# Patient Record
Sex: FEMALE | Race: WHITE | Employment: FULL TIME | URBAN - METROPOLITAN AREA
[De-identification: names, ages, dates, MRNs, and addresses within clinical notes are randomized per-mention and may not be internally consistent; named-entity substitution may affect disease eponyms.]

---

## 2017-01-27 ENCOUNTER — HOSPITAL ENCOUNTER (OUTPATIENT)
Dept: MAMMOGRAPHY | Age: 47
Discharge: HOME | End: 2017-01-27
Attending: OBSTETRICS & GYNECOLOGY
Payer: COMMERCIAL

## 2017-01-27 DIAGNOSIS — Z12.31 VISIT FOR SCREENING MAMMOGRAM: ICD-10-CM

## 2017-01-27 PROCEDURE — 77067 SCR MAMMO BI INCL CAD: CPT

## 2017-02-01 ENCOUNTER — HOSPITAL ENCOUNTER (OUTPATIENT)
Dept: MAMMOGRAPHY | Facility: HOSPITAL | Age: 47
Discharge: HOME | End: 2017-02-01
Attending: OBSTETRICS & GYNECOLOGY
Payer: COMMERCIAL

## 2017-02-01 DIAGNOSIS — R92.8 ABNORMAL MAMMOGRAM OF RIGHT BREAST: ICD-10-CM

## 2017-02-01 PROCEDURE — 77065 DX MAMMO INCL CAD UNI: CPT

## 2017-02-01 NOTE — IMAGING NOTE
Assisted Dr. Michael Lockett with Recommendation for a stereotactic breast biopsy for calcifications. Emotional and educational support provided. Written information provided to patient and she verbalized understanding.

## 2017-02-07 ENCOUNTER — TELEPHONE (OUTPATIENT)
Dept: MAMMOGRAPHY | Facility: HOSPITAL | Age: 47
End: 2017-02-07

## 2017-02-07 NOTE — TELEPHONE ENCOUNTER
Pt very anxious, calling about stereo biopsy due on 2/8. Emotional support provided and procedure explained. All questions answered and pt verbalized understanding.

## 2017-02-08 ENCOUNTER — HOSPITAL ENCOUNTER (OUTPATIENT)
Dept: MAMMOGRAPHY | Facility: HOSPITAL | Age: 47
Discharge: HOME | End: 2017-02-08
Attending: OBSTETRICS & GYNECOLOGY
Payer: COMMERCIAL

## 2017-02-08 DIAGNOSIS — R92.1 BREAST CALCIFICATIONS ON MAMMOGRAM: ICD-10-CM

## 2017-02-08 PROCEDURE — 88344 IMHCHEM/IMCYTCHM EA MLT ANTB: CPT | Performed by: OBSTETRICS & GYNECOLOGY

## 2017-02-08 PROCEDURE — 19081 BX BREAST 1ST LESION STRTCTC: CPT

## 2017-02-08 PROCEDURE — 88305 TISSUE EXAM BY PATHOLOGIST: CPT | Performed by: OBSTETRICS & GYNECOLOGY

## 2017-02-08 NOTE — PROCEDURES
PROCEDURE: ERIKA BIOPSY STEREOTACTIC W/CALC 1 SITE RIGHT    COMPARISON: [facility]DL VWS RIGHT (FOI=[id number]), 2/01/2017, 14:52.     INDICATIONS: R92.1 Mammographic calcification found on diagnostic imaging of breast    DESCRIPTION: Witnessed kenroy

## 2017-02-09 ENCOUNTER — TELEPHONE (OUTPATIENT)
Dept: MAMMOGRAPHY | Facility: HOSPITAL | Age: 47
End: 2017-02-09

## 2017-02-09 NOTE — TELEPHONE ENCOUNTER
Pt called and very anxious, she was inquiring if bx results were back yet. I informed her they are not and it can sometimes take up to 3 business days.  She informed me she has a result clinic appt tomorrow at 2pm. I called pathology and Charlie Trujillo will update th

## 2017-02-10 ENCOUNTER — TELEPHONE (OUTPATIENT)
Dept: MAMMOGRAPHY | Facility: HOSPITAL | Age: 47
End: 2017-02-10

## 2017-02-10 NOTE — TELEPHONE ENCOUNTER
Pt stated yesterday when I spoke with her she was ok to get benign results over the phone, but if + wanted to come in for result clinic appt at 2pm. I called and provided pt with biopsy results [facility]), I explained what the diagnosis meant and informed her t

## 2017-03-03 ENCOUNTER — OFFICE VISIT (OUTPATIENT)
Dept: SURGERY | Facility: CLINIC | Age: 47
End: 2017-03-03

## 2017-03-03 DIAGNOSIS — Z91.89 AT HIGH RISK FOR BREAST CANCER: ICD-10-CM

## 2017-03-03 DIAGNOSIS — Z12.39 BREAST CANCER SCREENING, HIGH RISK PATIENT: ICD-10-CM

## 2017-03-03 DIAGNOSIS — N60.91 ATYPICAL LOBULAR HYPERPLASIA (ALH) OF RIGHT BREAST: Primary | ICD-10-CM

## 2017-03-03 PROCEDURE — 99205 OFFICE O/P NEW HI 60 MIN: CPT | Performed by: SURGERY

## 2017-03-03 NOTE — PROGRESS NOTES
Patient presents with:  Consult: New consult, referred by co-worker for RB calcifications    Verified and updated allergy and medication list.     LMP: 02/06/17    Age of menarche: 17  Number of pregnancies:4  Age at 4st full term delivery: 31    Education

## 2017-03-04 NOTE — PROGRESS NOTES
Breast Surgery New Patient Consultation    This is the first visit for this 42year old woman, referred by self, who presents for evaluation of Right breast ALH.     History of Present Illness:   Ms. Alyssa Carson is a 42year old woman who presents w Ashkenazi Hoahaoism ancestry. Social History:    Alcohol Use: Yes       Smoking status: Never Smoker    Smokeless tobacco: Never Used   The patient is . She has 2 children. She is employed full time.      Review of Systems:  General:   The patient de or lymph nodes. Musculoskeletal:  The patient denies muscle aches/pain, joint pain, stiff joints, neck pain, back pain or bone pain.     Neuropsychiatric:  There is no history of migraines or severe headaches, seizure/epilepsy, speech problems, coordina There is no nipple retraction. No nipple discharge can be elicited. The parenchyma is mildly nodular. There are no dominant masses in the breast. The axillary tail is normal.    Abdomen: The abdomen is soft, flat and non tender. The liver is not enlarged. leading to additional imaging procedures and possible unnecessary biopsy. The specific of her imaging information were discussed, and in this context, we decided to proceed with MRI of both breasts. I will call her with the results when they are available.

## 2017-03-07 ENCOUNTER — TELEPHONE (OUTPATIENT)
Dept: SURGERY | Facility: CLINIC | Age: 47
End: 2017-03-07

## 2017-04-14 ENCOUNTER — HOSPITAL ENCOUNTER (OUTPATIENT)
Dept: MRI IMAGING | Age: 47
Discharge: HOME | End: 2017-04-14
Attending: SURGERY
Payer: COMMERCIAL

## 2017-04-14 DIAGNOSIS — N60.91 ATYPICAL LOBULAR HYPERPLASIA (ALH) OF RIGHT BREAST: ICD-10-CM

## 2017-04-14 DIAGNOSIS — Z12.39 BREAST CANCER SCREENING, HIGH RISK PATIENT: ICD-10-CM

## 2017-04-14 DIAGNOSIS — Z91.89 AT HIGH RISK FOR BREAST CANCER: ICD-10-CM

## 2017-04-14 DIAGNOSIS — N60.91 ATYPICAL LOBULAR HYPERPLASIA (ALH) OF RIGHT BREAST: Primary | ICD-10-CM

## 2017-04-14 PROCEDURE — 0159T MRI BREAST (W+WO) W/CAD BILAT (CPT=77059/0159T): CPT

## 2017-04-14 PROCEDURE — 77059 MRI BREAST (W+WO) W/CAD BILAT (CPT=77059/0159T): CPT

## 2017-04-14 PROCEDURE — A9575 INJ GADOTERATE MEGLUMI 0.1ML: HCPCS | Performed by: SURGERY

## 2017-04-17 ENCOUNTER — TELEPHONE (OUTPATIENT)
Dept: SURGERY | Facility: CLINIC | Age: 47
End: 2017-04-17

## 2017-04-17 NOTE — TELEPHONE ENCOUNTER
Name & date of birth verified. Pt notified that Dr. Tatiana Maravilla reviewed the MRI, and pt has no suspicious enhancement and does NOT need surgery at this time. Dr. Tatiana Maravilla wants to repeat pt's R mammogram in Oct, and has placed an order for this.  Pt should contac

## 2017-08-11 ENCOUNTER — TELEPHONE (OUTPATIENT)
Dept: SURGERY | Facility: CLINIC | Age: 47
End: 2017-08-11

## 2017-08-11 NOTE — TELEPHONE ENCOUNTER
Patient received a notice for mammogram in the mail from [facility], was confused because it did not come from [facility].     Reviewed past notes and confirmed that she is due for imaging in October and she asked that we start the insurance pre-authorization

## 2017-10-02 ENCOUNTER — TELEPHONE (OUTPATIENT)
Dept: SURGERY | Facility: CLINIC | Age: 47
End: 2017-10-02

## 2017-10-02 NOTE — TELEPHONE ENCOUNTER
Pt phoned in to report that she has had some back pain, between her shoulder blades that now moves to her chest, and some occ numbness, since her biopsy and MRI. It has improved over time, but still bothers her.   It improves with exercise and ibuprofen he

## 2017-10-06 PROBLEM — J45.21 MILD INTERMITTENT ASTHMA WITH EXACERBATION: Status: ACTIVE | Noted: 2017-10-06

## 2017-10-06 PROBLEM — J45.21 MILD INTERMITTENT ASTHMA WITH EXACERBATION (HCC): Status: ACTIVE | Noted: 2017-10-06

## 2017-11-24 ENCOUNTER — HOSPITAL ENCOUNTER (OUTPATIENT)
Dept: MAMMOGRAPHY | Age: 47
Discharge: HOME | End: 2017-11-24
Attending: SURGERY
Payer: COMMERCIAL

## 2017-11-24 DIAGNOSIS — N60.91 ATYPICAL LOBULAR HYPERPLASIA (ALH) OF RIGHT BREAST: ICD-10-CM

## 2017-11-24 PROCEDURE — 77065 DX MAMMO INCL CAD UNI: CPT | Performed by: SURGERY

## 2017-11-24 PROCEDURE — 77061 BREAST TOMOSYNTHESIS UNI: CPT | Performed by: SURGERY

## 2017-11-27 ENCOUNTER — TELEPHONE (OUTPATIENT)
Dept: SURGERY | Facility: CLINIC | Age: 47
End: 2017-11-27

## 2017-11-27 DIAGNOSIS — N60.99 ATYPICAL LOBULAR HYPERPLASIA (ALH) OF BREAST: Primary | ICD-10-CM

## 2017-11-27 DIAGNOSIS — Z12.39 BREAST CANCER SCREENING, HIGH RISK PATIENT: ICD-10-CM

## 2017-11-27 DIAGNOSIS — Z91.89 AT HIGH RISK FOR BREAST CANCER: ICD-10-CM

## 2017-11-27 NOTE — TELEPHONE ENCOUNTER
Pt identified herself on VM. LVM letting her know Dr Cipriano Navarro reviewed her recent imaging, and mammogram is normal. She does recommend breast MRI for surveillance that would take place in May. Asked patient to call me back to discuss.

## 2017-11-27 NOTE — TELEPHONE ENCOUNTER
Pt returned the call. She is agreeable to the MRI in May, pending insurance approval. I reviewed that we get it authorized closer to the time it's due.

## 2018-04-24 ENCOUNTER — TELEPHONE (OUTPATIENT)
Dept: SURGERY | Facility: CLINIC | Age: 48
End: 2018-04-24

## 2018-04-28 ENCOUNTER — OFFICE VISIT (OUTPATIENT)
Dept: OBGYN CLINIC | Facility: CLINIC | Age: 48
End: 2018-04-28

## 2018-04-28 VITALS
RESPIRATION RATE: 16 BRPM | DIASTOLIC BLOOD PRESSURE: 60 MMHG | HEIGHT: 66 IN | WEIGHT: 135 LBS | HEART RATE: 64 BPM | BODY MASS INDEX: 21.69 KG/M2 | SYSTOLIC BLOOD PRESSURE: 110 MMHG

## 2018-04-28 DIAGNOSIS — Z01.419 WELL WOMAN EXAM WITH ROUTINE GYNECOLOGICAL EXAM: ICD-10-CM

## 2018-04-28 DIAGNOSIS — Z12.4 SCREENING FOR MALIGNANT NEOPLASM OF CERVIX: Primary | ICD-10-CM

## 2018-04-28 DIAGNOSIS — Z12.39 SCREENING FOR MALIGNANT NEOPLASM OF BREAST: ICD-10-CM

## 2018-04-28 PROCEDURE — 99396 PREV VISIT EST AGE 40-64: CPT | Performed by: OBSTETRICS & GYNECOLOGY

## 2018-04-28 PROCEDURE — 87624 HPV HI-RISK TYP POOLED RSLT: CPT | Performed by: OBSTETRICS & GYNECOLOGY

## 2018-04-28 PROCEDURE — 88175 CYTOPATH C/V AUTO FLUID REDO: CPT | Performed by: OBSTETRICS & GYNECOLOGY

## 2018-04-28 NOTE — PROGRESS NOTES
Malcolm Moreira is a 41year old female  Patient's last menstrual period was 2018 (approximate). Patient presents with:  Wellness Visit: annual  .     Every month. complains of hot flashes and night sweats. Blood work will be ordered.   Mammo Wheezing., Disp: 1 Inhaler, Rfl: 0  •  Lysine 500 MG Oral Cap, Take by mouth., Disp: , Rfl:     ALLERGIES:    Seasonal                      Review of Systems:  Constitutional:  Denies fatigue, night sweats, hot flashes  Gastrointestinal:  denies heartburn,

## 2018-11-14 ENCOUNTER — TELEPHONE (OUTPATIENT)
Dept: SURGERY | Facility: CLINIC | Age: 48
End: 2018-11-14

## 2018-11-14 DIAGNOSIS — Z12.39 SCREENING FOR BREAST CANCER: Primary | ICD-10-CM

## 2018-11-14 NOTE — TELEPHONE ENCOUNTER
LVM letting her know that Dr Cipriano Navarro placed the order for her mammogram.   Asked her to call the office to arrange a follow up after the mammogram, since our last visit was 3/2017. Left central scheduling and office phone numbers.

## 2018-11-27 ENCOUNTER — TELEPHONE (OUTPATIENT)
Dept: SURGERY | Facility: CLINIC | Age: 48
End: 2018-11-27

## 2018-11-27 NOTE — TELEPHONE ENCOUNTER
JUSTUSM in response to patient having questions about her scheduled mammogram.   I let her know that it is a 2D3D screening mammogram.   Asked her to call me back with any additional questions

## 2018-11-29 ENCOUNTER — HOSPITAL ENCOUNTER (OUTPATIENT)
Dept: MAMMOGRAPHY | Age: 48
Discharge: HOME | End: 2018-11-29
Attending: SURGERY
Payer: COMMERCIAL

## 2018-11-29 DIAGNOSIS — Z12.39 SCREENING FOR BREAST CANCER: ICD-10-CM

## 2018-11-29 PROCEDURE — 77067 SCR MAMMO BI INCL CAD: CPT | Performed by: SURGERY

## 2018-11-29 PROCEDURE — 77063 BREAST TOMOSYNTHESIS BI: CPT | Performed by: SURGERY

## 2018-11-29 PROCEDURE — 82306 VITAMIN D 25 HYDROXY: CPT | Performed by: OBSTETRICS & GYNECOLOGY

## 2018-11-29 PROCEDURE — 80061 LIPID PANEL: CPT | Performed by: OBSTETRICS & GYNECOLOGY

## 2018-11-29 PROCEDURE — 80050 GENERAL HEALTH PANEL: CPT | Performed by: OBSTETRICS & GYNECOLOGY

## 2018-11-30 ENCOUNTER — TELEPHONE (OUTPATIENT)
Dept: SURGERY | Facility: CLINIC | Age: 48
End: 2018-11-30

## 2018-11-30 DIAGNOSIS — R92.8 ABNORMAL MAMMOGRAM OF BOTH BREASTS: Primary | ICD-10-CM

## 2018-11-30 NOTE — TELEPHONE ENCOUNTER
I contacted the patient that she needs Add views as whole breast could not be seen with the implants in the pictures they got and have her get those done before her followup office visit with me.      I let her know we should reschedule her follow up , if t

## 2018-12-05 ENCOUNTER — HOSPITAL ENCOUNTER (OUTPATIENT)
Dept: ULTRASOUND IMAGING | Age: 48
Discharge: HOME | End: 2018-12-05
Attending: SURGERY
Payer: COMMERCIAL

## 2018-12-05 ENCOUNTER — HOSPITAL ENCOUNTER (OUTPATIENT)
Dept: MAMMOGRAPHY | Age: 48
Discharge: HOME | End: 2018-12-05
Attending: SURGERY
Payer: COMMERCIAL

## 2018-12-05 DIAGNOSIS — R92.8 ABNORMAL MAMMOGRAM OF BOTH BREASTS: ICD-10-CM

## 2018-12-05 DIAGNOSIS — N63.20 LEFT BREAST MASS: Primary | ICD-10-CM

## 2018-12-05 PROCEDURE — 76642 ULTRASOUND BREAST LIMITED: CPT | Performed by: SURGERY

## 2018-12-05 PROCEDURE — 77062 BREAST TOMOSYNTHESIS BI: CPT | Performed by: SURGERY

## 2018-12-05 PROCEDURE — 77066 DX MAMMO INCL CAD BI: CPT | Performed by: SURGERY

## 2018-12-05 NOTE — IMAGING NOTE
Provided pt with written information on left breast u/s bx x 1 site at the recommendation of Dr Nydia Randle. Pt verbalized understanding, answered questions and emotional support provided, denies blood thinners/ASA/NSAIDS, biopsy order sheet faxed to [facility].

## 2018-12-06 ENCOUNTER — TELEPHONE (OUTPATIENT)
Dept: SURGERY | Facility: CLINIC | Age: 48
End: 2018-12-06

## 2018-12-06 NOTE — TELEPHONE ENCOUNTER
I contacted the patient regarding the recommendation for left breast biopsy. I confirmed with her that Dr Frantz Bazan agrees we should do the biopsy. Pt talked about the timing, and results, and her work schedule.    We will talk after it's scheduled and arran

## 2018-12-10 ENCOUNTER — TELEPHONE (OUTPATIENT)
Dept: MAMMOGRAPHY | Facility: HOSPITAL | Age: 48
End: 2018-12-10

## 2018-12-10 NOTE — TELEPHONE ENCOUNTER
This RN returned pt's voice mail regarding taking vitamin D tablets before her breast biopsy. Instructed pt that it is okay to take her home medications. Allowed pt to vent frustrations and concerns.

## 2018-12-12 ENCOUNTER — HOSPITAL ENCOUNTER (OUTPATIENT)
Dept: ULTRASOUND IMAGING | Age: 48
Discharge: HOME | End: 2018-12-12
Attending: SURGERY
Payer: COMMERCIAL

## 2018-12-12 ENCOUNTER — HOSPITAL ENCOUNTER (OUTPATIENT)
Dept: MAMMOGRAPHY | Age: 48
Discharge: HOME | End: 2018-12-12
Attending: SURGERY
Payer: COMMERCIAL

## 2018-12-12 DIAGNOSIS — N63.20 LEFT BREAST MASS: ICD-10-CM

## 2018-12-12 PROCEDURE — 77065 DX MAMMO INCL CAD UNI: CPT | Performed by: SURGERY

## 2018-12-12 PROCEDURE — 88305 TISSUE EXAM BY PATHOLOGIST: CPT | Performed by: SURGERY

## 2018-12-12 PROCEDURE — 19083 BX BREAST 1ST LESION US IMAG: CPT | Performed by: SURGERY

## 2018-12-12 NOTE — IMAGING NOTE
Procedure explained throughout and all questions answered. Consent and discharge paperwork signed by Manuelito Desir. Left breast positioned. Assisted Dr. Sonia Randle with US guided biopsy.  Pt tolerated well. Pressure held on biopsy site for 10 min.  No

## 2018-12-12 NOTE — PROCEDURES
DESCRIPTION:  The patient's previous mammogram/ultrasound were reviewed. The procedure, its alternatives, and potential risks and complications were discussed with the patient prior to the biopsy procedure. This included breast implant rupture.     Kusum Hill

## 2018-12-14 ENCOUNTER — OFFICE VISIT (OUTPATIENT)
Dept: SURGERY | Facility: CLINIC | Age: 48
End: 2018-12-14
Payer: COMMERCIAL

## 2018-12-14 VITALS
WEIGHT: 139 LBS | HEIGHT: 66.75 IN | HEART RATE: 67 BPM | OXYGEN SATURATION: 98 % | RESPIRATION RATE: 18 BRPM | DIASTOLIC BLOOD PRESSURE: 74 MMHG | SYSTOLIC BLOOD PRESSURE: 134 MMHG | BODY MASS INDEX: 21.82 KG/M2

## 2018-12-14 DIAGNOSIS — N60.91 ATYPICAL LOBULAR HYPERPLASIA (ALH) OF RIGHT BREAST: ICD-10-CM

## 2018-12-14 DIAGNOSIS — R92.8 ABNORMAL MAMMOGRAM OF LEFT BREAST: Primary | ICD-10-CM

## 2018-12-14 PROCEDURE — 99214 OFFICE O/P EST MOD 30 MIN: CPT | Performed by: SURGERY

## 2019-03-04 NOTE — PROGRESS NOTES
Breast Surgery Surveillance    History of Present Illness:   Ms. Malcolm Moreira is a 57year old woman who presented with a right imaging detected breast mass/calcifications. She was referred for diagnostic imaging which is detailed below.  She denies SURGICAL HISTORY      breast implants   • REMOVAL OF OVARIAN CYST(S) Left      Gynecological History:  Pt is a   Pt was 31years old at time of first pregnancy. She denies any cumulative breastfeeding history.   She achieved menarche at age 17 and LM breathing when lying flat, SOB/Coughing at night, swelling of the legs or chest pain while walking.     Breasts:  See history of present illness    Gastrointestinal:     There is no history of difficulty or pain with swallowing, reflux symptoms, vomiting, d appears her stated age. Her speech patterns and movements are normal. Her affect is appropriate. HEENT: The head is normocephalic. The neck is supple. The thyroid is not enlarged and is without palpable masses/nodules. There are no palpable masses.  The her recent imaging and pathology and we discussed this at length. The significance and implications of Scotts found on core biopsy with regard to future breast cancer risk was reviewed with the patient.  I explained that surigical excision of the area in th

## 2019-06-14 ENCOUNTER — HOSPITAL ENCOUNTER (OUTPATIENT)
Dept: ULTRASOUND IMAGING | Age: 49
Discharge: HOME | End: 2019-06-14
Attending: SURGERY
Payer: COMMERCIAL

## 2019-06-14 ENCOUNTER — HOSPITAL ENCOUNTER (OUTPATIENT)
Dept: MAMMOGRAPHY | Age: 49
Discharge: HOME | End: 2019-06-14
Attending: SURGERY
Payer: COMMERCIAL

## 2019-06-14 DIAGNOSIS — R92.8 ABNORMAL MAMMOGRAM OF LEFT BREAST: ICD-10-CM

## 2019-06-14 DIAGNOSIS — Z98.890 STATUS POST BIOPSY: ICD-10-CM

## 2019-06-14 PROCEDURE — 77065 DX MAMMO INCL CAD UNI: CPT | Performed by: SURGERY

## 2019-06-14 PROCEDURE — 77061 BREAST TOMOSYNTHESIS UNI: CPT | Performed by: SURGERY

## 2019-06-14 PROCEDURE — 76642 ULTRASOUND BREAST LIMITED: CPT | Performed by: SURGERY

## 2019-06-16 DIAGNOSIS — Z12.39 SCREENING BREAST EXAMINATION: Primary | ICD-10-CM

## 2019-07-22 PROBLEM — M26.609 TMJ DYSFUNCTION: Status: ACTIVE | Noted: 2019-07-22

## 2019-12-13 ENCOUNTER — HOSPITAL ENCOUNTER (OUTPATIENT)
Dept: MAMMOGRAPHY | Age: 49
Discharge: HOME | End: 2019-12-13
Attending: SURGERY
Payer: COMMERCIAL

## 2019-12-13 DIAGNOSIS — Z12.39 SCREENING BREAST EXAMINATION: ICD-10-CM

## 2019-12-13 PROCEDURE — 77063 BREAST TOMOSYNTHESIS BI: CPT | Performed by: SURGERY

## 2019-12-13 PROCEDURE — 77067 SCR MAMMO BI INCL CAD: CPT | Performed by: SURGERY

## 2021-02-15 NOTE — PROGRESS NOTES
Breast Surgery Surveillance Visit    History of Present Illness:   Ms. Juliana Archuleta is a 52year old woman who presented with a right imaging detected breast mass/calcifications. She was referred for diagnostic imaging which is detailed below.  She d cervical cancer screening 04/15, 02/12, 11/08, 10/07, 11/04    negative hpv negative   • Thalassemia minor      Past Surgical History:   Procedure Laterality Date   • CYST REMOVAL  2002    [facility]   • IMPLANTABLE BREAST PROSTHESIS     • ERIKA BIOPSY STEREO NODULE abnormal sound when breathing. Cardiovascular:   There is no history of chest pain, chest pressure/discomfort, palpitations, irregular heartbeat, fainting or near-fainting, difficulty breathing when lying flat, SOB/Coughing at night, swelling of the leg Temp 96.8 °F (36 °C) (Tympanic)   Resp 18   Ht 1.689 m (5' 6.5\")   Wt 61.7 kg (136 lb)   LMP 11/03/2020   SpO2 100%   BMI 21.62 kg/m²     Physical Exam:  The patient is an alert, oriented, well-nourished and  well-developed woman who appears her stated a and benign left breast biopsy. Discussion and Plan:  I had a discussion with the Patient regarding her breast exam. On exam today I found her to have no clinical evidence of malignancy bilaterally.   I personally reviewed her prior imaging we discussed t

## 2021-02-19 ENCOUNTER — OFFICE VISIT (OUTPATIENT)
Dept: SURGERY | Facility: CLINIC | Age: 51
End: 2021-02-19
Payer: COMMERCIAL

## 2021-02-19 VITALS
SYSTOLIC BLOOD PRESSURE: 148 MMHG | DIASTOLIC BLOOD PRESSURE: 85 MMHG | WEIGHT: 136 LBS | HEIGHT: 66.5 IN | RESPIRATION RATE: 18 BRPM | OXYGEN SATURATION: 100 % | BODY MASS INDEX: 21.6 KG/M2 | HEART RATE: 66 BPM | TEMPERATURE: 97 F

## 2021-02-19 DIAGNOSIS — Z12.31 ENCOUNTER FOR SCREENING MAMMOGRAM FOR HIGH-RISK PATIENT: ICD-10-CM

## 2021-02-19 DIAGNOSIS — N60.91 ATYPICAL LOBULAR HYPERPLASIA (ALH) OF RIGHT BREAST: Primary | ICD-10-CM

## 2021-02-19 PROCEDURE — 3079F DIAST BP 80-89 MM HG: CPT | Performed by: SURGERY

## 2021-02-19 PROCEDURE — 3077F SYST BP >= 140 MM HG: CPT | Performed by: SURGERY

## 2021-02-19 PROCEDURE — 3008F BODY MASS INDEX DOCD: CPT | Performed by: SURGERY

## 2021-02-19 PROCEDURE — 99214 OFFICE O/P EST MOD 30 MIN: CPT | Performed by: SURGERY

## 2021-03-30 ENCOUNTER — HOSPITAL ENCOUNTER (OUTPATIENT)
Dept: MAMMOGRAPHY | Age: 51
Discharge: HOME | End: 2021-03-30
Attending: SURGERY
Payer: COMMERCIAL

## 2021-03-30 DIAGNOSIS — Z12.31 ENCOUNTER FOR SCREENING MAMMOGRAM FOR HIGH-RISK PATIENT: ICD-10-CM

## 2021-03-30 PROCEDURE — 77067 SCR MAMMO BI INCL CAD: CPT | Performed by: SURGERY

## 2021-03-30 PROCEDURE — 77063 BREAST TOMOSYNTHESIS BI: CPT | Performed by: SURGERY

## 2021-03-31 ENCOUNTER — HOSPITAL ENCOUNTER (OUTPATIENT)
Dept: MAMMOGRAPHY | Facility: HOSPITAL | Age: 51
Discharge: HOME | End: 2021-03-31
Attending: SURGERY
Payer: COMMERCIAL

## 2021-03-31 DIAGNOSIS — R92.2 INCONCLUSIVE MAMMOGRAM: ICD-10-CM

## 2021-03-31 PROCEDURE — 77065 DX MAMMO INCL CAD UNI: CPT | Performed by: SURGERY

## 2021-03-31 PROCEDURE — 77061 BREAST TOMOSYNTHESIS UNI: CPT | Performed by: SURGERY

## 2021-03-31 NOTE — IMAGING NOTE
This Breast Care RN assisted Dr. Irene Beth with recommendation for a left breast 1 site stereotactic biopsy for calcifications. Procedure reviewed and all questions answered. Emotional and educational support given.    On the day of the biopsy, pt instruct

## 2021-04-01 ENCOUNTER — HOSPITAL ENCOUNTER (OUTPATIENT)
Dept: MAMMOGRAPHY | Facility: HOSPITAL | Age: 51
Discharge: HOME | End: 2021-04-01
Attending: PHYSICIAN ASSISTANT
Payer: COMMERCIAL

## 2021-04-01 DIAGNOSIS — R92.8 ABNORMAL MAMMOGRAM OF LEFT BREAST: ICD-10-CM

## 2021-04-01 PROCEDURE — 19081 BX BREAST 1ST LESION STRTCTC: CPT | Performed by: PHYSICIAN ASSISTANT

## 2021-04-01 PROCEDURE — 88305 TISSUE EXAM BY PATHOLOGIST: CPT | Performed by: PHYSICIAN ASSISTANT

## 2021-04-01 PROCEDURE — 88344 IMHCHEM/IMCYTCHM EA MLT ANTB: CPT | Performed by: PHYSICIAN ASSISTANT

## 2021-04-06 ENCOUNTER — TELEPHONE (OUTPATIENT)
Dept: MAMMOGRAPHY | Facility: HOSPITAL | Age: 51
End: 2021-04-06

## 2021-04-06 NOTE — TELEPHONE ENCOUNTER
Telephoned Mere Christine and name,  verified with patient. Earl Brock was aware of left breast positive for [facility] biopsy result. Concordance pending. Earl Vinod reports biopsy site is healing well.    Radiologist recommends surgical

## 2021-04-26 NOTE — PROGRESS NOTES
Breast Surgery Surveillance Visit    History of Present Illness:   Ms. Trevor Coles is a 52year old woman who presented with a right imaging detected breast mass/calcifications. She was referred for diagnostic imaging which is detailed below.  She d Diagnosis Date   • Chronic rhinitis    • Pap smear for cervical cancer screening 04/15, 02/12, 11/08, 10/07, 11/04    negative hpv negative   • Thalassemia minor      Past Surgical History:   Procedure Laterality Date   • CYST REMOVAL  2002    [facility]   • IMP emphysema, chronic bronchitis, shortness of breath or abnormal sound when breathing. Cardiovascular:   There is no history of chest pain, chest pressure/discomfort, palpitations, irregular heartbeat, fainting or near-fainting, difficulty breathing when Patient Position: Sitting, Cuff Size: adult)   Pulse 64   Temp 97.7 °F (36.5 °C)   Resp 18   Ht 1.689 m (5' 6.5\")   Wt 60 kg (132 lb 3.2 oz)   LMP 11/03/2020   SpO2 99%   BMI 21.02 kg/m²     Physical Exam:  The patient is an alert, oriented, well-nourishe 52year old woman presents with h/o right breast ALH and new diagnosis of left breast ALH.     Discussion and Plan:  I had a discussion with the Patient regarding her breast exam. On exam today I found her to have no clinical evidence of malignancy nomi

## 2021-04-27 ENCOUNTER — OFFICE VISIT (OUTPATIENT)
Dept: SURGERY | Facility: CLINIC | Age: 51
End: 2021-04-27
Payer: COMMERCIAL

## 2021-04-27 VITALS
SYSTOLIC BLOOD PRESSURE: 149 MMHG | TEMPERATURE: 98 F | DIASTOLIC BLOOD PRESSURE: 89 MMHG | OXYGEN SATURATION: 99 % | HEIGHT: 66.5 IN | WEIGHT: 132.19 LBS | RESPIRATION RATE: 18 BRPM | HEART RATE: 64 BPM | BODY MASS INDEX: 20.99 KG/M2

## 2021-04-27 DIAGNOSIS — Z91.89 AT HIGH RISK FOR BREAST CANCER: ICD-10-CM

## 2021-04-27 DIAGNOSIS — R92.2 DENSE BREAST: ICD-10-CM

## 2021-04-27 DIAGNOSIS — N60.92 ATYPICAL LOBULAR HYPERPLASIA (ALH) OF LEFT BREAST: Primary | ICD-10-CM

## 2021-04-27 PROCEDURE — 3077F SYST BP >= 140 MM HG: CPT | Performed by: SURGERY

## 2021-04-27 PROCEDURE — 3079F DIAST BP 80-89 MM HG: CPT | Performed by: SURGERY

## 2021-04-27 PROCEDURE — 99214 OFFICE O/P EST MOD 30 MIN: CPT | Performed by: SURGERY

## 2021-04-27 PROCEDURE — 3008F BODY MASS INDEX DOCD: CPT | Performed by: SURGERY

## 2021-05-25 ENCOUNTER — TELEPHONE (OUTPATIENT)
Dept: OBGYN CLINIC | Facility: CLINIC | Age: 51
End: 2021-05-25

## 2021-05-25 NOTE — TELEPHONE ENCOUNTER
Pt is calling to speak with a nurse about heavy bleeding she experienced yesterday, pt states she thought she was hemorrhaging because the amount of blood. Pt has upcoming appointment for 6/12 but is concerned this can not wait. Please call to advise.   Off

## 2021-05-25 NOTE — TELEPHONE ENCOUNTER
Patient has not been into office since 4/28/18. Advised because she is no longer established patient, she should refer to pcp on advisement. Understanding verbalized. Pt had bleeding episode Saturday, cannot make it in tomorrow. Patient will keep currentl

## 2021-05-26 ENCOUNTER — TELEPHONE (OUTPATIENT)
Dept: OBGYN CLINIC | Facility: CLINIC | Age: 51
End: 2021-05-26

## 2021-05-26 NOTE — TELEPHONE ENCOUNTER
Per Jordyn Le a  [facility] [address] for [employer], pt has a current insurance policy for medical coverage until the end of this moth. Reference # for this call is [id number].  Thanks

## 2021-05-28 ENCOUNTER — OFFICE VISIT (OUTPATIENT)
Dept: OBGYN CLINIC | Facility: CLINIC | Age: 51
End: 2021-05-28
Payer: COMMERCIAL

## 2021-05-28 VITALS
DIASTOLIC BLOOD PRESSURE: 82 MMHG | SYSTOLIC BLOOD PRESSURE: 126 MMHG | HEART RATE: 85 BPM | BODY MASS INDEX: 20.93 KG/M2 | WEIGHT: 131.81 LBS | HEIGHT: 66.5 IN

## 2021-05-28 DIAGNOSIS — Z12.31 ENCOUNTER FOR SCREENING MAMMOGRAM FOR BREAST CANCER: Primary | ICD-10-CM

## 2021-05-28 DIAGNOSIS — Z01.419 WELL WOMAN EXAM WITH ROUTINE GYNECOLOGICAL EXAM: ICD-10-CM

## 2021-05-28 DIAGNOSIS — Z12.4 PAPANICOLAOU SMEAR FOR CERVICAL CANCER SCREENING: ICD-10-CM

## 2021-05-28 PROCEDURE — 88175 CYTOPATH C/V AUTO FLUID REDO: CPT | Performed by: OBSTETRICS & GYNECOLOGY

## 2021-05-28 PROCEDURE — 3008F BODY MASS INDEX DOCD: CPT | Performed by: OBSTETRICS & GYNECOLOGY

## 2021-05-28 PROCEDURE — 3079F DIAST BP 80-89 MM HG: CPT | Performed by: OBSTETRICS & GYNECOLOGY

## 2021-05-28 PROCEDURE — 3074F SYST BP LT 130 MM HG: CPT | Performed by: OBSTETRICS & GYNECOLOGY

## 2021-05-28 PROCEDURE — 99386 PREV VISIT NEW AGE 40-64: CPT | Performed by: OBSTETRICS & GYNECOLOGY

## 2021-05-28 RX ORDER — MULTIVIT-MIN/IRON/FOLIC ACID/K 18-600-40
CAPSULE ORAL
COMMUNITY

## 2021-05-28 NOTE — PROGRESS NOTES
Bulmaro Dye is a 52year old female [id number] Patient's last menstrual period was 05/22/2021 (exact date). No chief complaint on file. Almita Spar Periods are becoming irregular she has been 14 to 21 days late not using anything for birth control.   Has yash Tobacco Use      Smoking status: Never Smoker      Smokeless tobacco: Never Used    Vaping Use      Vaping Use: Never used    Substance and Sexual Activity      Alcohol use: Yes      Drug use: No      Sexual activity: Yes        Partners: Male        Birth ALLERGIES:    Seasonal                      Review of Systems:  Constitutional:  Denies fatigue, night sweats, hot flashes  Gastrointestinal:  denies heartburn, abdominal pain, diarrhea or constipation  Genitourinary:  denies dysuria, incontinence, abn

## 2021-07-08 ENCOUNTER — OFFICE VISIT (OUTPATIENT)
Dept: OBGYN CLINIC | Facility: CLINIC | Age: 51
End: 2021-07-08
Payer: COMMERCIAL

## 2021-07-08 ENCOUNTER — TELEPHONE (OUTPATIENT)
Dept: OBGYN CLINIC | Facility: CLINIC | Age: 51
End: 2021-07-08

## 2021-07-08 VITALS
SYSTOLIC BLOOD PRESSURE: 110 MMHG | WEIGHT: 134 LBS | DIASTOLIC BLOOD PRESSURE: 52 MMHG | BODY MASS INDEX: 21.28 KG/M2 | HEART RATE: 66 BPM | HEIGHT: 66.5 IN

## 2021-07-08 DIAGNOSIS — Z30.430 ENCOUNTER FOR INSERTION OF INTRAUTERINE CONTRACEPTIVE DEVICE: ICD-10-CM

## 2021-07-08 DIAGNOSIS — Z01.812 PRE-PROCEDURAL LABORATORY EXAMINATION: Primary | ICD-10-CM

## 2021-07-08 LAB
CONTROL LINE PRESENT WITH A CLEAR BACKGROUND (YES/NO): YES YES/NO
PREGNANCY TEST, URINE: NEGATIVE

## 2021-07-08 PROCEDURE — 3078F DIAST BP <80 MM HG: CPT | Performed by: OBSTETRICS & GYNECOLOGY

## 2021-07-08 PROCEDURE — 3074F SYST BP LT 130 MM HG: CPT | Performed by: OBSTETRICS & GYNECOLOGY

## 2021-07-08 PROCEDURE — 81025 URINE PREGNANCY TEST: CPT | Performed by: OBSTETRICS & GYNECOLOGY

## 2021-07-08 PROCEDURE — 58300 INSERT INTRAUTERINE DEVICE: CPT | Performed by: OBSTETRICS & GYNECOLOGY

## 2021-07-08 PROCEDURE — 3008F BODY MASS INDEX DOCD: CPT | Performed by: OBSTETRICS & GYNECOLOGY

## 2021-07-08 RX ORDER — IBUPROFEN 200 MG
600 TABLET ORAL ONCE
Status: COMPLETED | OUTPATIENT
Start: 2021-07-08 | End: 2021-07-08

## 2021-07-08 RX ADMIN — IBUPROFEN 600 MG: 200 MG TABLET ORAL at 16:24:00

## 2021-07-08 NOTE — TELEPHONE ENCOUNTER
Spoke to Benjamin Ahuja from [employer]. She stated pt has coverage as of today and it is a month to month plan.   Ref # [id number]

## 2021-07-08 NOTE — PROGRESS NOTES
IUD Insertion     Pregnancy Results: negative from urine test   Birth control method(s) used: Facetectomy  She has consulted her oncologist who agrees that the [facility] might be a good reasonable treatment for her heavy periods    Consent signed.   Procedure d

## 2021-08-06 ENCOUNTER — TELEPHONE (OUTPATIENT)
Dept: OBGYN CLINIC | Facility: CLINIC | Age: 51
End: 2021-08-06

## 2021-08-06 ENCOUNTER — OFFICE VISIT (OUTPATIENT)
Dept: OBGYN CLINIC | Facility: CLINIC | Age: 51
End: 2021-08-06
Payer: COMMERCIAL

## 2021-08-06 VITALS
DIASTOLIC BLOOD PRESSURE: 80 MMHG | HEIGHT: 66.5 IN | SYSTOLIC BLOOD PRESSURE: 100 MMHG | HEART RATE: 49 BPM | BODY MASS INDEX: 21.12 KG/M2 | WEIGHT: 133 LBS

## 2021-08-06 DIAGNOSIS — Z30.40 ENCOUNTER FOR SURVEILLANCE OF CONTRACEPTIVES, UNSPECIFIED CONTRACEPTIVE: Primary | ICD-10-CM

## 2021-08-06 RX ORDER — LEVONORGESTREL 13.5 MG/1
1 INTRAUTERINE DEVICE INTRAUTERINE ONCE
COMMUNITY
Start: 2021-07-08

## 2021-08-06 NOTE — PROGRESS NOTES
Gyne note       S: patient is a 52year old yo  here for IUD check   Bleeding: regular menses, lighter  Pain: minimal cramping after placement, now none             O:/80   Pulse (!) 49   Ht 66.5\"   Wt 133 lb (60.3 kg)   LMP 2021 (Exact

## 2021-08-06 NOTE — TELEPHONE ENCOUNTER
Spoke to Phillip from [employer]. Insurance was verified, pt is currentlly eligible, it is on a month to month basis.   Ref # [id number]

## 2021-09-20 ENCOUNTER — HOSPITAL ENCOUNTER (OUTPATIENT)
Dept: MRI IMAGING | Facility: HOSPITAL | Age: 51
Discharge: HOME | End: 2021-09-20
Attending: SURGERY
Payer: COMMERCIAL

## 2021-09-20 DIAGNOSIS — N60.92 ATYPICAL LOBULAR HYPERPLASIA (ALH) OF LEFT BREAST: ICD-10-CM

## 2021-09-20 DIAGNOSIS — R92.2 DENSE BREAST: ICD-10-CM

## 2021-09-20 DIAGNOSIS — Z91.89 AT HIGH RISK FOR BREAST CANCER: ICD-10-CM

## 2021-09-20 PROCEDURE — 77049 MRI BREAST C-+ W/CAD BI: CPT | Performed by: SURGERY

## 2021-09-20 PROCEDURE — A9575 INJ GADOTERATE MEGLUMI 0.1ML: HCPCS | Performed by: SURGERY

## 2022-02-11 ENCOUNTER — LAB ENCOUNTER (OUTPATIENT)
Dept: LAB | Age: 52
End: 2022-02-11
Attending: OBSTETRICS & GYNECOLOGY
Payer: COMMERCIAL

## 2022-02-11 DIAGNOSIS — Z13.228 ENCOUNTER FOR SCREENING FOR METABOLIC DISORDER: ICD-10-CM

## 2022-02-11 DIAGNOSIS — Z13.220 ENCOUNTER FOR SCREENING FOR LIPID DISORDER: ICD-10-CM

## 2022-02-11 DIAGNOSIS — Z01.419 WELL WOMAN EXAM WITH ROUTINE GYNECOLOGICAL EXAM: ICD-10-CM

## 2022-02-11 LAB
ALBUMIN SERPL-MCNC: 4.1 G/DL (ref 3.4–5)
ALBUMIN/GLOB SERPL: 1.3 {RATIO} (ref 1–2)
ALP LIVER SERPL-CCNC: 27 U/L
ALT SERPL-CCNC: 18 U/L
ANION GAP SERPL CALC-SCNC: 3 MMOL/L (ref 0–18)
BASOPHILS # BLD AUTO: 0.03 X10(3) UL (ref 0–0.2)
BASOPHILS NFR BLD AUTO: 0.6 %
BILIRUB SERPL-MCNC: 1 MG/DL (ref 0.1–2)
BUN BLD-MCNC: 11 MG/DL (ref 7–18)
CALCIUM BLD-MCNC: 9.3 MG/DL (ref 8.5–10.1)
CHLORIDE SERPL-SCNC: 105 MMOL/L (ref 98–112)
CHOLEST SERPL-MCNC: 153 MG/DL (ref ?–200)
CO2 SERPL-SCNC: 29 MMOL/L (ref 21–32)
CREAT BLD-MCNC: 0.86 MG/DL
EOSINOPHIL # BLD AUTO: 0.22 X10(3) UL (ref 0–0.7)
EOSINOPHIL NFR BLD AUTO: 4.2 %
ERYTHROCYTE [DISTWIDTH] IN BLOOD BY AUTOMATED COUNT: 17.4 %
FASTING PATIENT LIPID ANSWER: YES
FASTING STATUS PATIENT QL REPORTED: YES
GLOBULIN PLAS-MCNC: 3.1 G/DL (ref 2.8–4.4)
GLUCOSE BLD-MCNC: 93 MG/DL (ref 70–99)
HCT VFR BLD AUTO: 36.5 %
HDLC SERPL-MCNC: 85 MG/DL (ref 40–59)
HGB BLD-MCNC: 11.3 G/DL
IMM GRANULOCYTES # BLD AUTO: 0.02 X10(3) UL (ref 0–1)
IMM GRANULOCYTES NFR BLD: 0.4 %
LDLC SERPL CALC-MCNC: 57 MG/DL (ref ?–100)
LYMPHOCYTES # BLD AUTO: 1.63 X10(3) UL (ref 1–4)
LYMPHOCYTES NFR BLD AUTO: 31.2 %
MCH RBC QN AUTO: 20.1 PG (ref 26–34)
MCHC RBC AUTO-ENTMCNC: 31 G/DL (ref 31–37)
MCV RBC AUTO: 64.9 FL
MONOCYTES # BLD AUTO: 0.47 X10(3) UL (ref 0.1–1)
MONOCYTES NFR BLD AUTO: 9 %
NEUTROPHILS # BLD AUTO: 2.85 X10 (3) UL (ref 1.5–7.7)
NEUTROPHILS # BLD AUTO: 2.85 X10(3) UL (ref 1.5–7.7)
NEUTROPHILS NFR BLD AUTO: 54.6 %
NONHDLC SERPL-MCNC: 68 MG/DL (ref ?–130)
OSMOLALITY SERPL CALC.SUM OF ELEC: 283 MOSM/KG (ref 275–295)
PLATELET # BLD AUTO: 322 10(3)UL (ref 150–450)
POTASSIUM SERPL-SCNC: 4.3 MMOL/L (ref 3.5–5.1)
PROT SERPL-MCNC: 7.2 G/DL (ref 6.4–8.2)
RBC # BLD AUTO: 5.62 X10(6)UL
SODIUM SERPL-SCNC: 137 MMOL/L (ref 136–145)
TRIGL SERPL-MCNC: 50 MG/DL (ref 30–149)
TSI SER-ACNC: 1.36 MIU/ML (ref 0.36–3.74)
VLDLC SERPL CALC-MCNC: 7 MG/DL (ref 0–30)
WBC # BLD AUTO: 5.2 X10(3) UL (ref 4–11)

## 2022-02-11 PROCEDURE — 85025 COMPLETE CBC W/AUTO DIFF WBC: CPT

## 2022-02-11 PROCEDURE — 80061 LIPID PANEL: CPT

## 2022-02-11 PROCEDURE — 36415 COLL VENOUS BLD VENIPUNCTURE: CPT

## 2022-02-11 PROCEDURE — 84443 ASSAY THYROID STIM HORMONE: CPT

## 2022-02-11 PROCEDURE — 80053 COMPREHEN METABOLIC PANEL: CPT

## 2022-04-01 ENCOUNTER — HOSPITAL ENCOUNTER (OUTPATIENT)
Dept: MAMMOGRAPHY | Facility: HOSPITAL | Age: 52
Discharge: HOME | End: 2022-04-01
Attending: OBSTETRICS & GYNECOLOGY
Payer: COMMERCIAL

## 2022-04-01 DIAGNOSIS — Z12.31 ENCOUNTER FOR SCREENING MAMMOGRAM FOR BREAST CANCER: ICD-10-CM

## 2022-04-01 PROCEDURE — 77063 BREAST TOMOSYNTHESIS BI: CPT | Performed by: OBSTETRICS & GYNECOLOGY

## 2022-04-01 PROCEDURE — 77067 SCR MAMMO BI INCL CAD: CPT | Performed by: OBSTETRICS & GYNECOLOGY

## 2022-09-02 ENCOUNTER — OFFICE VISIT (OUTPATIENT)
Dept: OBGYN CLINIC | Facility: CLINIC | Age: 52
End: 2022-09-02
Payer: COMMERCIAL

## 2022-09-02 VITALS
WEIGHT: 136 LBS | DIASTOLIC BLOOD PRESSURE: 62 MMHG | HEART RATE: 85 BPM | SYSTOLIC BLOOD PRESSURE: 104 MMHG | BODY MASS INDEX: 21.35 KG/M2 | HEIGHT: 67 IN

## 2022-09-02 DIAGNOSIS — Z01.419 WELL WOMAN EXAM WITH ROUTINE GYNECOLOGICAL EXAM: ICD-10-CM

## 2022-09-02 DIAGNOSIS — Z12.31 ENCOUNTER FOR SCREENING MAMMOGRAM FOR BREAST CANCER: Primary | ICD-10-CM

## 2022-09-02 PROCEDURE — 3008F BODY MASS INDEX DOCD: CPT | Performed by: OBSTETRICS & GYNECOLOGY

## 2022-09-02 PROCEDURE — 3074F SYST BP LT 130 MM HG: CPT | Performed by: OBSTETRICS & GYNECOLOGY

## 2022-09-02 PROCEDURE — 3078F DIAST BP <80 MM HG: CPT | Performed by: OBSTETRICS & GYNECOLOGY

## 2022-09-02 PROCEDURE — 99396 PREV VISIT EST AGE 40-64: CPT | Performed by: OBSTETRICS & GYNECOLOGY

## 2022-09-30 ENCOUNTER — OFFICE VISIT (OUTPATIENT)
Dept: SURGERY | Facility: CLINIC | Age: 52
End: 2022-09-30
Payer: COMMERCIAL

## 2022-09-30 VITALS
RESPIRATION RATE: 16 BRPM | DIASTOLIC BLOOD PRESSURE: 80 MMHG | SYSTOLIC BLOOD PRESSURE: 137 MMHG | WEIGHT: 134 LBS | HEART RATE: 67 BPM | OXYGEN SATURATION: 98 % | BODY MASS INDEX: 21 KG/M2

## 2022-09-30 DIAGNOSIS — N60.99 ATYPICAL HYPERPLASIA OF BREAST: Primary | ICD-10-CM

## 2022-09-30 DIAGNOSIS — R92.2 DENSE BREAST: ICD-10-CM

## 2022-09-30 DIAGNOSIS — Z91.89 AT HIGH RISK FOR BREAST CANCER: ICD-10-CM

## 2022-09-30 PROCEDURE — 3079F DIAST BP 80-89 MM HG: CPT | Performed by: SURGERY

## 2022-09-30 PROCEDURE — 3075F SYST BP GE 130 - 139MM HG: CPT | Performed by: SURGERY

## 2022-09-30 PROCEDURE — 99213 OFFICE O/P EST LOW 20 MIN: CPT | Performed by: SURGERY

## 2022-10-21 ENCOUNTER — HOSPITAL ENCOUNTER (OUTPATIENT)
Dept: MRI IMAGING | Facility: HOSPITAL | Age: 52
Discharge: HOME | End: 2022-10-21
Attending: SURGERY
Payer: COMMERCIAL

## 2022-10-21 DIAGNOSIS — N60.99 ATYPICAL HYPERPLASIA OF BREAST: ICD-10-CM

## 2022-10-21 DIAGNOSIS — R92.2 DENSE BREAST: ICD-10-CM

## 2022-10-21 DIAGNOSIS — Z91.89 AT HIGH RISK FOR BREAST CANCER: ICD-10-CM

## 2022-10-21 PROCEDURE — A9575 INJ GADOTERATE MEGLUMI 0.1ML: HCPCS | Performed by: SURGERY

## 2022-10-21 PROCEDURE — 77049 MRI BREAST C-+ W/CAD BI: CPT | Performed by: SURGERY

## 2022-12-02 ENCOUNTER — E-ADVICE (OUTPATIENT)
Dept: CARDIOLOGY | Age: 52
End: 2022-12-02

## 2022-12-02 ENCOUNTER — OFFICE VISIT (OUTPATIENT)
Dept: CARDIOLOGY | Age: 52
End: 2022-12-02

## 2022-12-02 VITALS
HEART RATE: 66 BPM | WEIGHT: 133 LBS | HEIGHT: 67 IN | DIASTOLIC BLOOD PRESSURE: 80 MMHG | SYSTOLIC BLOOD PRESSURE: 147 MMHG | BODY MASS INDEX: 20.88 KG/M2

## 2022-12-02 DIAGNOSIS — R07.2 PRECORDIAL PAIN: ICD-10-CM

## 2022-12-02 DIAGNOSIS — Z82.49 FAMILY HISTORY OF PREMATURE CAD: ICD-10-CM

## 2022-12-02 DIAGNOSIS — R93.1 ELEVATED CORONARY ARTERY CALCIUM SCORE: Primary | ICD-10-CM

## 2022-12-02 PROCEDURE — 99204 OFFICE O/P NEW MOD 45 MIN: CPT | Performed by: INTERNAL MEDICINE

## 2022-12-02 RX ORDER — ALBUTEROL SULFATE 90 UG/1
AEROSOL, METERED RESPIRATORY (INHALATION)
COMMUNITY
Start: 2022-10-25

## 2022-12-02 RX ORDER — ATORVASTATIN CALCIUM 20 MG/1
20 TABLET, FILM COATED ORAL DAILY
Qty: 90 TABLET | Refills: 3 | Status: SHIPPED | OUTPATIENT
Start: 2022-12-02 | End: 2023-06-09 | Stop reason: SDUPTHER

## 2022-12-02 SDOH — HEALTH STABILITY: PHYSICAL HEALTH: ON AVERAGE, HOW MANY MINUTES DO YOU ENGAGE IN EXERCISE AT THIS LEVEL?: 60 MIN

## 2022-12-02 SDOH — HEALTH STABILITY: PHYSICAL HEALTH: ON AVERAGE, HOW MANY DAYS PER WEEK DO YOU ENGAGE IN MODERATE TO STRENUOUS EXERCISE (LIKE A BRISK WALK)?: 3 DAYS

## 2022-12-02 ASSESSMENT — ENCOUNTER SYMPTOMS
WEIGHT GAIN: 0
WEIGHT LOSS: 0
COUGH: 0
HEMATOCHEZIA: 0
FEVER: 0
HEMOPTYSIS: 0
ALLERGIC/IMMUNOLOGIC COMMENTS: NO NEW FOOD ALLERGIES
CHILLS: 0
SUSPICIOUS LESIONS: 0
BRUISES/BLEEDS EASILY: 0

## 2022-12-02 ASSESSMENT — PATIENT HEALTH QUESTIONNAIRE - PHQ9
SUM OF ALL RESPONSES TO PHQ9 QUESTIONS 1 AND 2: 0
CLINICAL INTERPRETATION OF PHQ2 SCORE: NO FURTHER SCREENING NEEDED
1. LITTLE INTEREST OR PLEASURE IN DOING THINGS: NOT AT ALL
SUM OF ALL RESPONSES TO PHQ9 QUESTIONS 1 AND 2: 0
2. FEELING DOWN, DEPRESSED OR HOPELESS: NOT AT ALL

## 2022-12-06 ENCOUNTER — TELEPHONE (OUTPATIENT)
Dept: CARDIOLOGY | Age: 52
End: 2022-12-06

## 2022-12-06 DIAGNOSIS — R07.2 PRECORDIAL PAIN: Primary | ICD-10-CM

## 2022-12-06 DIAGNOSIS — R93.1 ELEVATED CORONARY ARTERY CALCIUM SCORE: ICD-10-CM

## 2022-12-06 DIAGNOSIS — Z82.49 FAMILY HISTORY OF PREMATURE CAD: ICD-10-CM

## 2023-01-02 ENCOUNTER — APPOINTMENT (OUTPATIENT)
Dept: CARDIOLOGY | Age: 53
End: 2023-01-02
Attending: INTERNAL MEDICINE

## 2023-01-04 ENCOUNTER — APPOINTMENT (OUTPATIENT)
Dept: CARDIOLOGY | Age: 53
End: 2023-01-04
Attending: INTERNAL MEDICINE

## 2023-01-13 ENCOUNTER — E-ADVICE (OUTPATIENT)
Dept: CARDIOLOGY | Age: 53
End: 2023-01-13

## 2023-01-14 LAB
ALBUMIN SERPL-MCNC: 4.8 G/DL (ref 3.6–5.1)
ALBUMIN/GLOB SERPL: 1.9 (CALC) (ref 1–2.5)
ALP SERPL-CCNC: 35 U/L (ref 37–153)
ALT SERPL-CCNC: 24 U/L (ref 6–29)
AST SERPL-CCNC: 25 U/L (ref 10–35)
BILIRUB SERPL-MCNC: 1 MG/DL (ref 0.2–1.2)
BUN SERPL-MCNC: 17 MG/DL (ref 7–25)
BUN/CREAT SERPL: ABNORMAL (CALC) (ref 6–22)
CALCIUM SERPL-MCNC: 9.9 MG/DL (ref 8.6–10.4)
CHLORIDE SERPL-SCNC: 103 MMOL/L (ref 98–110)
CHOLEST SERPL-MCNC: 119 MG/DL
CHOLEST/HDLC SERPL: 1.6 (CALC)
CO2 SERPL-SCNC: 31 MMOL/L (ref 20–32)
CREAT SERPL-MCNC: 0.85 MG/DL (ref 0.5–1.03)
EGFRCR SERPLBLD CKD-EPI 2021: 83 ML/MIN/1.73M2
GLOBULIN SER CALC-MCNC: 2.5 G/DL (CALC) (ref 1.9–3.7)
GLUCOSE SERPL-MCNC: 92 MG/DL (ref 65–99)
HDLC SERPL-MCNC: 75 MG/DL
LDLC SERPL CALC-MCNC: 33 MG/DL (CALC)
LPA SERPL-SCNC: <10 NMOL/L
NONHDLC SERPL-MCNC: 44 MG/DL (CALC)
POTASSIUM SERPL-SCNC: 4.8 MMOL/L (ref 3.5–5.3)
PROT SERPL-MCNC: 7.3 G/DL (ref 6.1–8.1)
SODIUM SERPL-SCNC: 137 MMOL/L (ref 135–146)
TRIGL SERPL-MCNC: 40 MG/DL

## 2023-01-25 ENCOUNTER — APPOINTMENT (OUTPATIENT)
Dept: CARDIOLOGY | Age: 53
End: 2023-01-25
Attending: INTERNAL MEDICINE

## 2023-02-09 ENCOUNTER — APPOINTMENT (OUTPATIENT)
Dept: CARDIOLOGY | Age: 53
End: 2023-02-09
Attending: INTERNAL MEDICINE

## 2023-02-22 ENCOUNTER — ANCILLARY PROCEDURE (OUTPATIENT)
Dept: CARDIOLOGY | Age: 53
End: 2023-02-22
Attending: INTERNAL MEDICINE

## 2023-02-22 ENCOUNTER — E-ADVICE (OUTPATIENT)
Dept: CARDIOLOGY | Age: 53
End: 2023-02-22

## 2023-02-22 DIAGNOSIS — R07.2 PRECORDIAL PAIN: ICD-10-CM

## 2023-02-22 DIAGNOSIS — R93.1 ELEVATED CORONARY ARTERY CALCIUM SCORE: ICD-10-CM

## 2023-02-22 DIAGNOSIS — Z82.49 FAMILY HISTORY OF PREMATURE CAD: ICD-10-CM

## 2023-02-22 LAB — STRESS TARGET HR: 170 BPM

## 2023-02-22 PROCEDURE — 93351 STRESS TTE COMPLETE: CPT | Performed by: INTERNAL MEDICINE

## 2023-02-23 ENCOUNTER — TELEPHONE (OUTPATIENT)
Dept: CARDIOLOGY | Age: 53
End: 2023-02-23

## 2023-04-03 ENCOUNTER — HOSPITAL ENCOUNTER (OUTPATIENT)
Dept: MAMMOGRAPHY | Facility: HOSPITAL | Age: 53
Discharge: HOME | End: 2023-04-03
Attending: OBSTETRICS & GYNECOLOGY
Payer: COMMERCIAL

## 2023-04-03 DIAGNOSIS — Z12.31 ENCOUNTER FOR SCREENING MAMMOGRAM FOR BREAST CANCER: ICD-10-CM

## 2023-04-03 PROCEDURE — 77063 BREAST TOMOSYNTHESIS BI: CPT | Performed by: OBSTETRICS & GYNECOLOGY

## 2023-04-03 PROCEDURE — 77067 SCR MAMMO BI INCL CAD: CPT | Performed by: OBSTETRICS & GYNECOLOGY

## 2023-04-20 ENCOUNTER — HOSPITAL ENCOUNTER (OUTPATIENT)
Dept: MAMMOGRAPHY | Facility: HOSPITAL | Age: 53
Discharge: HOME | End: 2023-04-20
Attending: OBSTETRICS & GYNECOLOGY
Payer: COMMERCIAL

## 2023-04-20 DIAGNOSIS — R92.2 INCONCLUSIVE MAMMOGRAM: ICD-10-CM

## 2023-04-20 DIAGNOSIS — R92.1 BREAST CALCIFICATIONS: Primary | ICD-10-CM

## 2023-04-20 PROCEDURE — 77065 DX MAMMO INCL CAD UNI: CPT | Performed by: OBSTETRICS & GYNECOLOGY

## 2023-04-20 PROCEDURE — 77061 BREAST TOMOSYNTHESIS UNI: CPT | Performed by: OBSTETRICS & GYNECOLOGY

## 2023-04-28 ENCOUNTER — HOSPITAL ENCOUNTER (OUTPATIENT)
Dept: MAMMOGRAPHY | Facility: HOSPITAL | Age: 53
Discharge: HOME | End: 2023-04-28
Attending: OBSTETRICS & GYNECOLOGY
Payer: COMMERCIAL

## 2023-04-28 DIAGNOSIS — R92.1 BREAST CALCIFICATIONS: ICD-10-CM

## 2023-04-28 PROCEDURE — 88342 IMHCHEM/IMCYTCHM 1ST ANTB: CPT | Performed by: OBSTETRICS & GYNECOLOGY

## 2023-04-28 PROCEDURE — 88305 TISSUE EXAM BY PATHOLOGIST: CPT | Performed by: OBSTETRICS & GYNECOLOGY

## 2023-04-28 PROCEDURE — 88341 IMHCHEM/IMCYTCHM EA ADD ANTB: CPT | Performed by: OBSTETRICS & GYNECOLOGY

## 2023-04-28 PROCEDURE — 19081 BX BREAST 1ST LESION STRTCTC: CPT | Performed by: OBSTETRICS & GYNECOLOGY

## 2023-05-01 NOTE — IMAGING NOTE
9310: Spoke with RN Andrzej Farah in Dr. Sho Solis office. Discussed pathology results for Ciaran Christine's right breast biopsy performed April 28 as follows:   Final Diagnosis:   Stereotactic core biopsy, breast, right, 11:00:  -Breast tissue showing features consistent with atypical ductal hyperplasia (ADH) associated with microcalcifications. Per Andrzej Farah- Dr. Nahomi Oliva referring to Dr. Carlos Norwood. Informed Andrzej Farah that this RN discussed pathology as above with Tom Luong. Ms. Sherry Hendrickson shared that she has established care with Dr. Danielle Lozada. Instructed Ciaran Goodsonbrad to make an appointment with Dr. Danielle Lozada. Andrzej Farah will report pathology as above and Ms. Christine's intent to continue care with Dr. Danielle Lozada to Dr. Nahomi Oliva.

## 2023-05-01 NOTE — IMAGING NOTE
9858: Spoke with Maria Del Rosario Reed post stereotactic right breast biopsy. Introduced myself and informed Ms. Christine of the purpose of my call. Name and date of birth verified by patient. Reinforced post biopsy care and instruction. Ms. Mounika Nelson denies any issues with biopsy site- bleeding, drainage, redness, tenderness. Pathology results and recommendations discussed as follows:   Final Diagnosis:   Stereotactic core biopsy, breast, right, 11:00:  -Breast tissue showing features consistent with atypical ductal hyperplasia (ADH) associated with microcalcifications. See EMR for complete pathology report    Recommendation- surgical referral  Maria Del Rosario Reed shared that she is under the care of Dr. Amrit Arias. Ms. Mounika Nelson instructed to make an appointment with Dr. Amrit Arias. Encouraged Maria Del Rosario Reed to follow-up with Dr. Yanick Logan or the breast center if she has questions/concerns prior to appointment with Dr. Amrit Arias. Maria Del Rosario Reed verbalized understanding and agreement to the above.

## 2023-05-23 ENCOUNTER — OFFICE VISIT (OUTPATIENT)
Dept: SURGERY | Facility: CLINIC | Age: 53
End: 2023-05-23
Payer: COMMERCIAL

## 2023-05-23 ENCOUNTER — TELEPHONE (OUTPATIENT)
Dept: SURGERY | Facility: CLINIC | Age: 53
End: 2023-05-23

## 2023-05-23 ENCOUNTER — TELEPHONE (OUTPATIENT)
Dept: GENERAL RADIOLOGY | Facility: HOSPITAL | Age: 53
End: 2023-05-23

## 2023-05-23 ENCOUNTER — E-ADVICE (OUTPATIENT)
Dept: CARDIOLOGY | Age: 53
End: 2023-05-23

## 2023-05-23 VITALS
HEIGHT: 67 IN | SYSTOLIC BLOOD PRESSURE: 160 MMHG | DIASTOLIC BLOOD PRESSURE: 90 MMHG | OXYGEN SATURATION: 98 % | RESPIRATION RATE: 14 BRPM | HEART RATE: 73 BPM | BODY MASS INDEX: 21.03 KG/M2 | WEIGHT: 134 LBS

## 2023-05-23 DIAGNOSIS — N60.91 ATYPICAL DUCTAL HYPERPLASIA OF RIGHT BREAST: Primary | ICD-10-CM

## 2023-05-23 PROCEDURE — 99215 OFFICE O/P EST HI 40 MIN: CPT | Performed by: SURGERY

## 2023-05-23 PROCEDURE — 3077F SYST BP >= 140 MM HG: CPT | Performed by: SURGERY

## 2023-05-23 PROCEDURE — 3008F BODY MASS INDEX DOCD: CPT | Performed by: SURGERY

## 2023-05-23 PROCEDURE — 3080F DIAST BP >= 90 MM HG: CPT | Performed by: SURGERY

## 2023-05-23 RX ORDER — ATORVASTATIN CALCIUM 20 MG/1
20 TABLET, FILM COATED ORAL DAILY
COMMUNITY
Start: 2023-03-01

## 2023-05-23 NOTE — TELEPHONE ENCOUNTER
1500: Curtis Holguin returned the breast care coordinator's call. Spoke briefly with Ms. Marisol Bauman. Introduced myself and informed Ms. Christine of the purpose of my previous call. Curtis Holguin shared that she is in the process of rescheduling surgical procedure with Dr. Delta aGr. Ms. Annetta Noel shared her preference to discuss breast localization procedure at a more convenient time/date. Curtis Holguin reported that she will contact the breast center to complete education. Ms. Marisol Bauman provided with phone number to the breast care coordinators.

## 2023-05-23 NOTE — PATIENT INSTRUCTIONS
Dr. Danny Aldridge  Tel: [phone]  Fax: [address] [facility] [address] [place], [place] [facility]  [phone]     Surgery/Procedure: Right breast wire localized excisional biopsy     Anesthesia:   Gen  Surgery Length:   45 minutes CPT:  89204   Wire LOC:   Yes Nuc Med:   No   Sonam Seed:  No       Dx & ICD-10: Atypical ductal hyperplasia of right breast (N60.91)   Radiology Instructions: Right breast, 11 o'clock position, top hat shaped clip, biopsy demonstrates atypical ductal hyperplasia.    _______________________________________________________________________________    Someone must accompany you the day of the procedure to drive you home safely, because of anesthesia. You will need an adult  to stay with you the first night following your surgery. You must remove any kind of makeup, acrylic nails, lotions, powders, creams or deodorant. [place] ONLY: Pre-admission will give instruct you on when to take Gatorade and Tylenol/acetaminophen prior to your surgery, purchase 2 - 12oz bottles of regular Gatorade (NOT RED/SUGAR FREE). Otherwise, you may not eat or drink anything else after 11PM the night before surgery. [place] ONLY: You may not eat or drink anything after midnight the day of your surgery. Wear comfortable clothing that can be easily removed. If you wear dentures, contacts lenses, or any prosthesis, you will be asked to remove them. Do not drink alcohol or smoke 24 hours prior to your procedure. Bring a picture ID and your insurance card. GENERAL ANESTHESIA ONLY: You will be contacted by the hospital for Pre-Admission Covid-19 testing (regardless of vaccination status) to be scheduled as an appointment prior to surgery. They will call closer to the surgery date to set this up, because the earliest this can be done is 72 hours prior to surgery.   The Pre-Admission Testing Department will call the day before to confirm your procedure, give you the time you need to arrive by and directions on where to go. They begin making calls after 2pm, if you are not contacted by 4pm, please call the surgeon's office listed above. Do not take any blood thinners at least one week prior to the procedure/surgery. This includes aspirin, baby aspirin, Ibuprofen products, herbal supplements, diet medications, vitamin E, fish oil and green tea supplements. Please check other supplements for these ingredients. *TYLENOL or acetaminophen is acceptable*  If you take Coumadin, Plavix, Xarelto, or Eliquis, please contact your prescribing physician for special instructions on how long to hold. If you take insulin contact your primary care physician for special instructions. Our surgery scheduler, Jaspreet Fernandes, will be contacting you to discuss surgery dates. If you have any questions related to scheduling your surgery, please reach out to her at [phone].  _____________________________________________________________________  PRE-OPERATIVE TESTING IF INDICATED BELOW  PLEASE COMPLETE ASAP (AT LEAST 14-21 DAYS PRIOR TO SURGERY)  [] CBC [x] BMP [] CMP [x] EKG    [] PT, PTT, INR [] Cardiac Clearance  [x] H&P Medical Clearance [] Chest X-ray     Please call Central Scheduling to schedule an appointment for pre-operative labs/tests @ [phone]    Does the patient have a pacemaker or ICD?      [] Yes   [x] No

## 2023-05-23 NOTE — TELEPHONE ENCOUNTER
Calling pt in regards to scheduling surgery. Informed pt that I have 06/16/2023 available at [facility] with Dr. Zoey Serna. Pt verbalized understanding and in agreement with date and location. All questions answered. Encouraged pt to call or [employer] message office with any other questions or concerns.

## 2023-05-24 PROBLEM — N60.91 ATYPICAL DUCTAL HYPERPLASIA OF RIGHT BREAST: Status: ACTIVE | Noted: 2023-05-24

## 2023-05-25 DIAGNOSIS — N60.91 ATYPICAL DUCTAL HYPERPLASIA OF RIGHT BREAST: Primary | ICD-10-CM

## 2023-05-30 ENCOUNTER — TELEPHONE (OUTPATIENT)
Dept: SURGERY | Facility: CLINIC | Age: 53
End: 2023-05-30

## 2023-05-30 ENCOUNTER — TELEPHONE (OUTPATIENT)
Dept: MAMMOGRAPHY | Facility: HOSPITAL | Age: 53
End: 2023-05-30

## 2023-05-30 DIAGNOSIS — N60.91 ATYPICAL DUCTAL HYPERPLASIA OF RIGHT BREAST: Primary | ICD-10-CM

## 2023-05-30 NOTE — TELEPHONE ENCOUNTER
Called and spoke to patient. Unable to talk at this time. Requesting a call back on 6-2 to discuss breast wire localization education.

## 2023-05-30 NOTE — TELEPHONE ENCOUNTER
Called and left patient a message regarding scheduling her surgery for 07/16/2023 with Dr. Gianna Christensen

## 2023-06-02 ENCOUNTER — APPOINTMENT (OUTPATIENT)
Dept: CARDIOLOGY | Age: 53
End: 2023-06-02

## 2023-06-02 ENCOUNTER — TELEPHONE (OUTPATIENT)
Dept: MAMMOGRAPHY | Facility: HOSPITAL | Age: 53
End: 2023-06-02

## 2023-06-02 RX ORDER — SCOLOPAMINE TRANSDERMAL SYSTEM 1 MG/1
1 PATCH, EXTENDED RELEASE TRANSDERMAL ONCE
Status: CANCELLED | OUTPATIENT
Start: 2023-06-02 | End: 2023-06-02

## 2023-06-02 RX ORDER — MECLIZINE HYDROCHLORIDE 25 MG/1
25 TABLET ORAL 3 TIMES DAILY PRN
COMMUNITY

## 2023-06-02 NOTE — TELEPHONE ENCOUNTER
Phoned Jj Christine regarding needle localization process of breast for lumpectomy scheduled for 6-16-23 with Dr. Geraldine Collins. Procedure explained and all questions answered. Pt to be transported via W/C through [facility] to [facility] in [place]. Pt verbalized understanding and had no further questions at this time.

## 2023-06-06 ENCOUNTER — TELEPHONE (OUTPATIENT)
Dept: SURGERY | Facility: CLINIC | Age: 53
End: 2023-06-06

## 2023-06-09 ENCOUNTER — OFFICE VISIT (OUTPATIENT)
Dept: CARDIOLOGY | Age: 53
End: 2023-06-09

## 2023-06-09 VITALS
SYSTOLIC BLOOD PRESSURE: 135 MMHG | HEIGHT: 67 IN | WEIGHT: 135 LBS | DIASTOLIC BLOOD PRESSURE: 84 MMHG | HEART RATE: 84 BPM | BODY MASS INDEX: 21.19 KG/M2

## 2023-06-09 DIAGNOSIS — R07.2 PRECORDIAL PAIN: ICD-10-CM

## 2023-06-09 DIAGNOSIS — R93.1 ELEVATED CORONARY ARTERY CALCIUM SCORE: Primary | ICD-10-CM

## 2023-06-09 DIAGNOSIS — E78.5 DYSLIPIDEMIA: ICD-10-CM

## 2023-06-09 DIAGNOSIS — Z82.49 FAMILY HISTORY OF PREMATURE CAD: ICD-10-CM

## 2023-06-09 PROCEDURE — 99214 OFFICE O/P EST MOD 30 MIN: CPT | Performed by: INTERNAL MEDICINE

## 2023-06-09 RX ORDER — ATORVASTATIN CALCIUM 20 MG/1
20 TABLET, FILM COATED ORAL DAILY
Qty: 90 TABLET | Refills: 3 | Status: SHIPPED | OUTPATIENT
Start: 2023-06-09

## 2023-06-09 RX ORDER — MECLIZINE HYDROCHLORIDE 25 MG/1
TABLET ORAL PRN
COMMUNITY
Start: 2023-05-31

## 2023-06-09 ASSESSMENT — ENCOUNTER SYMPTOMS
HEMATOCHEZIA: 0
CHILLS: 0
HEMOPTYSIS: 0
BRUISES/BLEEDS EASILY: 0
ALLERGIC/IMMUNOLOGIC COMMENTS: NO NEW FOOD ALLERGIES
COUGH: 0
FEVER: 0
WEIGHT GAIN: 0
WEIGHT LOSS: 0
SUSPICIOUS LESIONS: 0

## 2023-06-15 ENCOUNTER — ANESTHESIA EVENT (OUTPATIENT)
Dept: SURGERY | Facility: HOSPITAL | Age: 53
End: 2023-06-15
Payer: COMMERCIAL

## 2023-06-15 NOTE — IMAGING NOTE
Assisted  with mammography guided needle localization of the right breast.   Amado Hatch identified with spelling of name and date of birth. Medications and allergies reviewed. The following allergies were reported:   Seasonal   Adhesive TapeRASH    History: Atypical ductal hyperplasia of right breast  Surgery: Right breast wire localized excisional biopsy    Order verified. Procedure explained and questions answered. Amado Hatch verbalized understanding and agreement. 1125: Written consent obtained. 1135: Scans taken by Prieto Hernández- mammography technologist    66 65 76: Dr. Marcos Meza present    66 65 76: Time out complete. 1137: Site prepped in a sterile manner. 1138: Lidocaine administered for anesthetic affect. 1138: Cross 20G x 5cm needle placed-right breast, 11 o'clock position, top hat shaped clip, biopsy demonstrates atypical ductal hyperplasia. Emotional support provided. Alisson Tera Keykalliebrad tolerated procedure well. Site cleaned. Wire secured with blue clip, steri strips, sterile 4x4 gauze dressing,and Tegaderm. Amado Hatch transported via wheelchair to pre-op/surgery holding in stable condition. Ms. Eloise Mcnally without complaints or concerns at this time.

## 2023-06-16 ENCOUNTER — HOSPITAL ENCOUNTER (OUTPATIENT)
Facility: HOSPITAL | Age: 53
Setting detail: HOSPITAL OUTPATIENT SURGERY
Discharge: HOME | End: 2023-06-16
Attending: SURGERY | Admitting: SURGERY
Payer: COMMERCIAL

## 2023-06-16 ENCOUNTER — HOSPITAL ENCOUNTER (OUTPATIENT)
Dept: MAMMOGRAPHY | Facility: HOSPITAL | Age: 53
Discharge: HOME | End: 2023-06-16
Attending: SURGERY | Admitting: SURGERY
Payer: COMMERCIAL

## 2023-06-16 ENCOUNTER — ANESTHESIA (OUTPATIENT)
Dept: SURGERY | Facility: HOSPITAL | Age: 53
End: 2023-06-16
Payer: COMMERCIAL

## 2023-06-16 VITALS
TEMPERATURE: 98 F | DIASTOLIC BLOOD PRESSURE: 74 MMHG | HEIGHT: 67 IN | BODY MASS INDEX: 21.19 KG/M2 | HEART RATE: 67 BPM | OXYGEN SATURATION: 100 % | SYSTOLIC BLOOD PRESSURE: 122 MMHG | WEIGHT: 135 LBS | RESPIRATION RATE: 18 BRPM

## 2023-06-16 DIAGNOSIS — N60.91 ATYPICAL DUCTAL HYPERPLASIA OF RIGHT BREAST: Primary | ICD-10-CM

## 2023-06-16 DIAGNOSIS — N60.91 ATYPICAL DUCTAL HYPERPLASIA OF RIGHT BREAST: ICD-10-CM

## 2023-06-16 LAB — B-HCG UR QL: NEGATIVE

## 2023-06-16 PROCEDURE — 0HBT0ZZ EXCISION OF RIGHT BREAST, OPEN APPROACH: ICD-10-PCS | Performed by: SURGERY

## 2023-06-16 PROCEDURE — 19281 PERQ DEVICE BREAST 1ST IMAG: CPT | Performed by: SURGERY

## 2023-06-16 PROCEDURE — 76098 X-RAY EXAM SURGICAL SPECIMEN: CPT | Performed by: SURGERY

## 2023-06-16 PROCEDURE — 88307 TISSUE EXAM BY PATHOLOGIST: CPT | Performed by: SURGERY

## 2023-06-16 PROCEDURE — 81025 URINE PREGNANCY TEST: CPT

## 2023-06-16 RX ORDER — ONDANSETRON 2 MG/ML
INJECTION INTRAMUSCULAR; INTRAVENOUS AS NEEDED
Status: DISCONTINUED | OUTPATIENT
Start: 2023-06-16 | End: 2023-06-16 | Stop reason: SURG

## 2023-06-16 RX ORDER — HYDROMORPHONE HYDROCHLORIDE 1 MG/ML
0.6 INJECTION, SOLUTION INTRAMUSCULAR; INTRAVENOUS; SUBCUTANEOUS EVERY 5 MIN PRN
Status: DISCONTINUED | OUTPATIENT
Start: 2023-06-16 | End: 2023-06-16

## 2023-06-16 RX ORDER — HYDROMORPHONE HYDROCHLORIDE 1 MG/ML
0.4 INJECTION, SOLUTION INTRAMUSCULAR; INTRAVENOUS; SUBCUTANEOUS EVERY 5 MIN PRN
Status: DISCONTINUED | OUTPATIENT
Start: 2023-06-16 | End: 2023-06-16

## 2023-06-16 RX ORDER — CEFAZOLIN SODIUM/WATER 2 G/20 ML
2 SYRINGE (ML) INTRAVENOUS ONCE
Status: COMPLETED | OUTPATIENT
Start: 2023-06-16 | End: 2023-06-16

## 2023-06-16 RX ORDER — MIDAZOLAM HYDROCHLORIDE 1 MG/ML
1 INJECTION INTRAMUSCULAR; INTRAVENOUS EVERY 5 MIN PRN
Status: DISCONTINUED | OUTPATIENT
Start: 2023-06-16 | End: 2023-06-16

## 2023-06-16 RX ORDER — DEXAMETHASONE SODIUM PHOSPHATE 4 MG/ML
VIAL (ML) INJECTION AS NEEDED
Status: DISCONTINUED | OUTPATIENT
Start: 2023-06-16 | End: 2023-06-16 | Stop reason: SURG

## 2023-06-16 RX ORDER — HYDROMORPHONE HYDROCHLORIDE 1 MG/ML
0.2 INJECTION, SOLUTION INTRAMUSCULAR; INTRAVENOUS; SUBCUTANEOUS EVERY 5 MIN PRN
Status: DISCONTINUED | OUTPATIENT
Start: 2023-06-16 | End: 2023-06-16

## 2023-06-16 RX ORDER — LIDOCAINE HYDROCHLORIDE 10 MG/ML
INJECTION, SOLUTION EPIDURAL; INFILTRATION; INTRACAUDAL; PERINEURAL AS NEEDED
Status: DISCONTINUED | OUTPATIENT
Start: 2023-06-16 | End: 2023-06-16 | Stop reason: SURG

## 2023-06-16 RX ORDER — NALOXONE HYDROCHLORIDE 0.4 MG/ML
80 INJECTION, SOLUTION INTRAMUSCULAR; INTRAVENOUS; SUBCUTANEOUS AS NEEDED
Status: DISCONTINUED | OUTPATIENT
Start: 2023-06-16 | End: 2023-06-16

## 2023-06-16 RX ORDER — LIDOCAINE HYDROCHLORIDE AND EPINEPHRINE 10; 10 MG/ML; UG/ML
INJECTION, SOLUTION INFILTRATION; PERINEURAL AS NEEDED
Status: DISCONTINUED | OUTPATIENT
Start: 2023-06-16 | End: 2023-06-16 | Stop reason: HOSPADM

## 2023-06-16 RX ORDER — HYDROCODONE BITARTRATE AND ACETAMINOPHEN 5; 325 MG/1; MG/1
2 TABLET ORAL ONCE AS NEEDED
Status: DISCONTINUED | OUTPATIENT
Start: 2023-06-16 | End: 2023-06-16

## 2023-06-16 RX ORDER — MEPERIDINE HYDROCHLORIDE 25 MG/ML
12.5 INJECTION INTRAMUSCULAR; INTRAVENOUS; SUBCUTANEOUS AS NEEDED
Status: DISCONTINUED | OUTPATIENT
Start: 2023-06-16 | End: 2023-06-16

## 2023-06-16 RX ORDER — HYDROCODONE BITARTRATE AND ACETAMINOPHEN 5; 325 MG/1; MG/1
1-2 TABLET ORAL EVERY 6 HOURS PRN
Qty: 20 TABLET | Refills: 0 | Status: SHIPPED | OUTPATIENT
Start: 2023-06-16 | End: 2023-06-22 | Stop reason: ALTCHOICE

## 2023-06-16 RX ORDER — SODIUM CHLORIDE, SODIUM LACTATE, POTASSIUM CHLORIDE, CALCIUM CHLORIDE 600; 310; 30; 20 MG/100ML; MG/100ML; MG/100ML; MG/100ML
INJECTION, SOLUTION INTRAVENOUS CONTINUOUS
Status: DISCONTINUED | OUTPATIENT
Start: 2023-06-16 | End: 2023-06-16

## 2023-06-16 RX ORDER — PROCHLORPERAZINE EDISYLATE 5 MG/ML
5 INJECTION INTRAMUSCULAR; INTRAVENOUS EVERY 8 HOURS PRN
Status: DISCONTINUED | OUTPATIENT
Start: 2023-06-16 | End: 2023-06-16

## 2023-06-16 RX ORDER — ACETAMINOPHEN 500 MG
1000 TABLET ORAL ONCE
Status: DISCONTINUED | OUTPATIENT
Start: 2023-06-16 | End: 2023-06-16 | Stop reason: HOSPADM

## 2023-06-16 RX ORDER — ACETAMINOPHEN 500 MG
1000 TABLET ORAL ONCE AS NEEDED
Status: DISCONTINUED | OUTPATIENT
Start: 2023-06-16 | End: 2023-06-16

## 2023-06-16 RX ORDER — HYDROCODONE BITARTRATE AND ACETAMINOPHEN 5; 325 MG/1; MG/1
1 TABLET ORAL ONCE AS NEEDED
Status: DISCONTINUED | OUTPATIENT
Start: 2023-06-16 | End: 2023-06-16

## 2023-06-16 RX ORDER — DIAZEPAM 5 MG/1
5 TABLET ORAL AS NEEDED
Status: DISCONTINUED | OUTPATIENT
Start: 2023-06-16 | End: 2023-06-16 | Stop reason: HOSPADM

## 2023-06-16 RX ORDER — ONDANSETRON 2 MG/ML
4 INJECTION INTRAMUSCULAR; INTRAVENOUS EVERY 6 HOURS PRN
Status: DISCONTINUED | OUTPATIENT
Start: 2023-06-16 | End: 2023-06-16

## 2023-06-16 RX ORDER — BUPIVACAINE HYDROCHLORIDE 5 MG/ML
INJECTION, SOLUTION EPIDURAL; INTRACAUDAL AS NEEDED
Status: DISCONTINUED | OUTPATIENT
Start: 2023-06-16 | End: 2023-06-16 | Stop reason: HOSPADM

## 2023-06-16 RX ADMIN — CEFAZOLIN SODIUM/WATER 2 G: 2 G/20 ML SYRINGE (ML) INTRAVENOUS at 13:39:00

## 2023-06-16 RX ADMIN — DEXAMETHASONE SODIUM PHOSPHATE 4 MG: 4 MG/ML VIAL (ML) INJECTION at 13:41:00

## 2023-06-16 RX ADMIN — LIDOCAINE HYDROCHLORIDE 50 MG: 10 INJECTION, SOLUTION EPIDURAL; INFILTRATION; INTRACAUDAL; PERINEURAL at 13:44:00

## 2023-06-16 RX ADMIN — ONDANSETRON 4 MG: 2 INJECTION INTRAMUSCULAR; INTRAVENOUS at 13:48:00

## 2023-06-16 RX ADMIN — SODIUM CHLORIDE, SODIUM LACTATE, POTASSIUM CHLORIDE, CALCIUM CHLORIDE: 600; 310; 30; 20 INJECTION, SOLUTION INTRAVENOUS at 13:39:00

## 2023-06-16 NOTE — ANESTHESIA PROCEDURE NOTES
Airway  Date/Time: 6/16/2023 1:45 PM  Urgency: elective      General Information and Staff    Patient location during procedure: OR  Anesthesiologist: Comfort Jenkins MD  Performed: anesthesiologist   Performed by: Comfort Jenkins MD  Authorized by: Comfort Jenkins MD      Indications and Patient Condition  Indications for airway management: anesthesia  Sedation level: deep  Preoxygenated: yes  Patient position: sniffing  Mask difficulty assessment: 1 - vent by mask    Final Airway Details  Final airway type: supraglottic airway      Successful airway: classic  Size 3       Number of attempts at approach: 1  Number of other approaches attempted: 0

## 2023-06-16 NOTE — OPERATIVE REPORT
[address]    PATIENT'S NAME: Laura Pratt   ATTENDING PHYSICIAN: Brad Henderson. Tomas Ricketts M.D. OPERATING PHYSICIAN: Brad Henderson. Tomas Ricketts M.D. 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  PROCEDURE:  Right breast wire-localized lumpectomy with right breast specimen radiography. ANESTHESIA:  General anesthesia and local.    ESTIMATED BLOOD LOSS:  5 mL. DRAINS:  None. COMPLICATIONS:  None. DISPOSITION:  Stable on transfer to recovery room. INDICATIONS:  The patient is a 53-year-old female. She has an imaging-detected concern of the right breast.  She has had a personal history of atypical lobular hyperplasia. She has a history of bilateral subpectoral silicone breast augmentation. She was found to have new calcifications in her recent imaging, and pathology confirmed atypical ductal hyperplasia. We discussed local treatment options including wide surgical excision to exclude coexisting malignancy in the area, and she was agreeable to this plan. Risks and possible complications were discussed with the patient including, but not limited to, infection, bleeding, injury to surrounding structures, and possible need for reoperation. She agreed to the proposed surgery. OPERATIVE TECHNIQUE:  Patient was brought to the imaging suite. She underwent a wire localization in the area of concern in the right breast.  She was brought to the OR, placed in supine position, properly padded and secured, given a dose of IV antibiotics, and sequential compression devices were applied to the legs for DVT prophylaxis.   General anesthesia was induced, and the right breast was then prepped and draped in usual sterile fashion. Lidocaine 1% with epinephrine was used to infiltrate the skin and subcutaneous tissue of the targeted incision site. A curvilinear incision was made along the superior areolar border with a 15-blade knife in the skin. Wire was identified and brought into the field. Using sharp dissection and electrocautery, a segment of breast tissue surrounding the tip of the wire was carefully excised and oriented with a short single and clip superiorly, long stitch and double clip laterally, in order to allow for appropriate pathological margin assessment and review. It was then placed in the imaging device where specimen x-ray confirmed the presence of targeted clip and residual area with adequate margins as deemed by myself. A clip was then placed back within the cavity to assist with subsequent surveillance. The wound was irrigated, and hemostasis was assured with electrocautery. Closure was accomplished with an interrupted 3-0 Vicryl for deep layer, running 4-0 subcuticular Monocryl for the skin. Mastisol and Steri-Strips were applied, and 0.5% Marcaine was instilled in the cavity to assist with postoperative analgesia. A sterile dressing and compression bra were placed. Her blood loss was minimal.  All counts were correct at the conclusion of the procedure. She tolerated the procedure well. She was transferred to recovery area in stable condition. Dictated By Laura Delgado M.D.  d: 06/16/2023 14:24:45  t: 06/16/2023 15:31:01  Job [id number]  COI/    cc: CHARMAINE Craven M.D.

## 2023-06-16 NOTE — BRIEF OP NOTE
Pre-Operative Diagnosis: Atypical ductal hyperplasia of right breast [N60.91]     Post-Operative Diagnosis: Atypical ductal hyperplasia of right breast [N60.91]      Procedure Performed:     Right breast wire localized excisional biopsy    Surgeon(s) and Role:     Sussy Urrutia MD - Primary    Assistant(s):  Surgical Assistant.: Bulmaro Daniels CSA     Surgical Findings: Clip noted on specimen radiograph     Specimen: R lumpectomy     Estimated Blood Loss: 5cc    Gisselle Lozada MD  6/16/2023  1:40 PM

## 2023-06-22 ENCOUNTER — OFFICE VISIT (OUTPATIENT)
Dept: SURGERY | Facility: CLINIC | Age: 53
End: 2023-06-22
Payer: COMMERCIAL

## 2023-06-22 VITALS
BODY MASS INDEX: 21.4 KG/M2 | HEART RATE: 66 BPM | RESPIRATION RATE: 16 BRPM | OXYGEN SATURATION: 99 % | SYSTOLIC BLOOD PRESSURE: 153 MMHG | WEIGHT: 136.38 LBS | DIASTOLIC BLOOD PRESSURE: 82 MMHG | TEMPERATURE: 98 F | HEIGHT: 67 IN

## 2023-06-22 DIAGNOSIS — N60.91 ATYPICAL DUCTAL HYPERPLASIA OF RIGHT BREAST: Primary | ICD-10-CM

## 2023-06-22 PROCEDURE — 3008F BODY MASS INDEX DOCD: CPT

## 2023-06-22 PROCEDURE — 3079F DIAST BP 80-89 MM HG: CPT

## 2023-06-22 PROCEDURE — 3077F SYST BP >= 140 MM HG: CPT

## 2023-06-22 PROCEDURE — 99024 POSTOP FOLLOW-UP VISIT: CPT

## 2023-07-07 ENCOUNTER — OFFICE VISIT (OUTPATIENT)
Dept: SURGERY | Facility: CLINIC | Age: 53
End: 2023-07-07
Payer: COMMERCIAL

## 2023-07-07 VITALS
WEIGHT: 135.19 LBS | HEART RATE: 74 BPM | BODY MASS INDEX: 21.22 KG/M2 | OXYGEN SATURATION: 100 % | HEIGHT: 67 IN | RESPIRATION RATE: 16 BRPM | DIASTOLIC BLOOD PRESSURE: 86 MMHG | SYSTOLIC BLOOD PRESSURE: 128 MMHG | TEMPERATURE: 98 F

## 2023-07-07 DIAGNOSIS — N60.99 ATYPICAL HYPERPLASIA OF BREAST: Primary | ICD-10-CM

## 2023-07-07 DIAGNOSIS — Z91.89 AT HIGH RISK FOR BREAST CANCER: ICD-10-CM

## 2023-07-07 PROCEDURE — 3074F SYST BP LT 130 MM HG: CPT | Performed by: SURGERY

## 2023-07-07 PROCEDURE — 99024 POSTOP FOLLOW-UP VISIT: CPT | Performed by: SURGERY

## 2023-07-07 PROCEDURE — 3079F DIAST BP 80-89 MM HG: CPT | Performed by: SURGERY

## 2023-07-07 PROCEDURE — 3008F BODY MASS INDEX DOCD: CPT | Performed by: SURGERY

## 2023-07-28 ENCOUNTER — OFFICE VISIT (OUTPATIENT)
Dept: SURGERY | Facility: CLINIC | Age: 53
End: 2023-07-28
Payer: COMMERCIAL

## 2023-07-28 VITALS
WEIGHT: 137.81 LBS | HEART RATE: 54 BPM | SYSTOLIC BLOOD PRESSURE: 146 MMHG | BODY MASS INDEX: 21.63 KG/M2 | OXYGEN SATURATION: 100 % | HEIGHT: 67 IN | RESPIRATION RATE: 16 BRPM | DIASTOLIC BLOOD PRESSURE: 79 MMHG

## 2023-07-28 DIAGNOSIS — N60.99 ATYPICAL HYPERPLASIA OF BREAST: Primary | ICD-10-CM

## 2023-07-28 PROCEDURE — 3078F DIAST BP <80 MM HG: CPT | Performed by: SURGERY

## 2023-07-28 PROCEDURE — 3077F SYST BP >= 140 MM HG: CPT | Performed by: SURGERY

## 2023-07-28 PROCEDURE — 3008F BODY MASS INDEX DOCD: CPT | Performed by: SURGERY

## 2023-07-28 PROCEDURE — 99243 OFF/OP CNSLTJ NEW/EST LOW 30: CPT | Performed by: SURGERY

## 2023-07-28 NOTE — PATIENT INSTRUCTIONS
Surgeon:         Dr. Lila Ortega                                        Tel:         [phone]                                  Fax:        [phone]    Surgery/Procedure: Immediate breast reconstruction with placement of bilateral breast tissue expanders with acellular dermal matrix, possible direct to bilateral breast implants, 3 hours, outpatient, general anesthesia, joint with Dr. Tomas Ricketts, intraoperative PEC block      Dx Code:  N60.91    Hospital:  [facility]: [address] [place] [address], [place], [address]           [phone]  [facility]: [address], [facility]               [phone]    1. Someone will need to drive you to and from the hospital if your procedure is outpatient. 2.Do not drink alcohol or smoke 24 hours prior to your procedure. 3. Bring a picture ID and your insurance card. 4. You will be contacted by the hospital the day before to confirm the procedure time and location. 5. The hospital will also contact you approximately one week before surgery to schedule your COVID test (only if surgery is inpatient/overnight stay). Please inform us if you develop any Covid-19 like symptoms, test positive or have been exposed for Covid- 19 prior to surgery. 6. Do not take any herbal supplements or blood thinners at least one week before your procedure/surgery. This includes NSAID's (aspirin, baby aspirin, Motrin, Ibuprofen, Aleve, Advil, Naproxen, etc), Plavix, fish oil, vitamin E, turmeric, CoQ10, or green tea supplements, etc. *TYLENOL or acetaminophen is ok to take*    7. PRE-OPERATIVE TESTING: History and physical with medical clearance is REQUIRED within 30 days of the surgery date and is mandatory per Dr. Bernardo Munson. *If this is not done, your surgery will be postponed*  MEDICAL CLEARANCE WITH DR. Dixon  CBC  CMP  EKG    8.  Please inform us if you start or change any medications at least one week before surgery (ex: blood thinners, weight loss medications, diabetic medications, herbal supplements, etc)    Consent obtained  Photos taken on 7/28/2023

## 2023-08-24 ENCOUNTER — DOCUMENTATION ONLY (OUTPATIENT)
Dept: SURGERY | Facility: CLINIC | Age: 53
End: 2023-08-24

## 2023-08-24 ENCOUNTER — TELEPHONE (OUTPATIENT)
Dept: SURGERY | Facility: CLINIC | Age: 53
End: 2023-08-24

## 2023-08-24 DIAGNOSIS — N60.91 ATYPICAL DUCTAL HYPERPLASIA OF RIGHT BREAST: Primary | ICD-10-CM

## 2023-08-24 NOTE — TELEPHONE ENCOUNTER
Calling pt in regards to scheduling surgery. Informed pt that I have 11/16/2023 available at [facility] with Dr. John Bell. Pt verbalized understanding and in agreement with date and location. All questions answered. Encouraged pt to call or [facility] message office with any other questions or concerns.

## 2023-08-24 NOTE — PATIENT INSTRUCTIONS
Dr. Rose Martínez  Tel: [phone]  Fax: [address] [facility] [address] [place], [address] [facility]  [phone]     Surgery/Procedure: Prophylactic bilateral breast nipple sparing mastectomies Lakeshia Pall) with reconstruction Tiffanie Rodriges). Anesthesia:   Gen + request intraoperative PEC block from anesthesia   Surgery Length:   2 hours CPT:  46121   Wire LOC:   No   Nuc Med:   No   Sonam Seed:  No         Dx & ICD-10: Atypical ductal hyperplasia of right breast (N60.91). Radiology Instructions: N/A   _______________________________________________________________________________    Someone must accompany you the day of the procedure to drive you home safely, because of anesthesia. You will need an adult  to stay with you the first night following your surgery. You must remove any kind of makeup, acrylic nails, lotions, powders, creams or deodorant. [place] ONLY: Pre-admission will give instruct you on when to take Gatorade and Tylenol/acetaminophen prior to your surgery, purchase 2 - 12oz bottles of regular Gatorade (NOT RED/SUGAR FREE). Otherwise, you may not eat or drink anything else after 11PM the night before surgery. [place] ONLY: You may not eat or drink anything after midnight the day of your surgery. Wear comfortable clothing that can be easily removed. If you wear dentures, contacts lenses, or any prosthesis, you will be asked to remove them. Do not drink alcohol or smoke 24 hours prior to your procedure. Bring a picture ID and your insurance card. Covid-19 testing is no longer required before surgery unless you are experiencing symptoms such as fever, cough, congestion, etc.   The Pre-Admission Testing Department will call the day before to confirm your procedure, give you the time you need to arrive by and directions on where to go. They begin making calls after 2pm, if you are not contacted by 4pm, please call the surgeon's office listed above.   Do not take any blood thinners at least one week prior to the procedure/surgery. This includes aspirin, baby aspirin, Ibuprofen products, herbal supplements, diet medications, vitamin E, fish oil and green tea supplements. Please check other supplements for these ingredients. *TYLENOL or acetaminophen is acceptable*  If you take Coumadin, Plavix, Xarelto, or Eliquis, please contact your prescribing physician for special instructions on how long to hold. If you take insulin contact your primary care physician for special instructions. Our surgery scheduler, Puma Lockett, will be contacting you to discuss surgery dates. If you have any questions related to scheduling your surgery, please reach out to her at [phone].  _____________________________________________________________________  PRE-OPERATIVE TESTING IF INDICATED BELOW  PLEASE COMPLETE ASAP (AT LEAST 14-21 DAYS PRIOR TO SURGERY)  [] CBC [] BMP [] CMP [] EKG    [] PT, PTT, INR [] Cardiac Clearance  [] H&P Medical Clearance [] Chest X-ray     Please call Central Scheduling to schedule an appointment for pre-operative labs/tests @ [phone]  Does the patient have a pacemaker or ICD? Does the patient have sleep apnea?   [] Yes   [x] No                               [] Yes   [x] No

## 2023-08-25 ENCOUNTER — TELEPHONE (OUTPATIENT)
Dept: SURGERY | Facility: CLINIC | Age: 53
End: 2023-08-25

## 2023-08-25 NOTE — TELEPHONE ENCOUNTER
Called patient and answered questions regarding breast tissue expanders vs direct to implant approach. All questions answered and pt reported understanding.  Pt appreciative of the call

## 2023-10-06 ENCOUNTER — OFFICE VISIT (OUTPATIENT)
Facility: CLINIC | Age: 53
End: 2023-10-06
Payer: COMMERCIAL

## 2023-10-06 VITALS
WEIGHT: 137.63 LBS | BODY MASS INDEX: 21.6 KG/M2 | HEIGHT: 67 IN | SYSTOLIC BLOOD PRESSURE: 116 MMHG | DIASTOLIC BLOOD PRESSURE: 70 MMHG | HEART RATE: 69 BPM

## 2023-10-06 DIAGNOSIS — Z23 NEED FOR VACCINATION: ICD-10-CM

## 2023-10-06 DIAGNOSIS — Z01.419 WELL WOMAN EXAM WITH ROUTINE GYNECOLOGICAL EXAM: Primary | ICD-10-CM

## 2023-10-06 PROCEDURE — 90471 IMMUNIZATION ADMIN: CPT | Performed by: OBSTETRICS & GYNECOLOGY

## 2023-10-06 PROCEDURE — 99396 PREV VISIT EST AGE 40-64: CPT | Performed by: OBSTETRICS & GYNECOLOGY

## 2023-10-06 PROCEDURE — 90686 IIV4 VACC NO PRSV 0.5 ML IM: CPT | Performed by: OBSTETRICS & GYNECOLOGY

## 2023-10-06 PROCEDURE — 3074F SYST BP LT 130 MM HG: CPT | Performed by: OBSTETRICS & GYNECOLOGY

## 2023-10-06 PROCEDURE — 3078F DIAST BP <80 MM HG: CPT | Performed by: OBSTETRICS & GYNECOLOGY

## 2023-10-06 PROCEDURE — 3008F BODY MASS INDEX DOCD: CPT | Performed by: OBSTETRICS & GYNECOLOGY

## 2023-10-06 NOTE — PROGRESS NOTES
Mike Corbin is a 50year old female [id number] Patient's last menstrual period was 2023 (exact date). Patient presents with:  Wellness Visit  . She is seeing a breast surgeon she had a mammogram in April. She is scheduled to have her implants removed soon. She has not done a colonoscopy or Cologuard this was discussed. Blood work is done with her primary. Flu shot will be given today. She has irregular periods. She gets warm sometimes but does not need treatment now    OBSTETRICS HISTORY:  OB History    Para Term  AB Living   2 2 2     2   SAB IAB Ectopic Multiple Live Births           2      # Outcome Date GA Lbr Javan/2nd Weight Sex Delivery Anes PTL Lv   2 Term 10/19/04 39w0d  8 lb (3.629 kg) F NORMAL SPONT   KAREN   1 Term 01 40w0d  8 lb 4 oz (3.742 kg) F NORMAL SPONT   KAREN       GYNE HISTORY:  Periods irregular light      Sexual activity:   Yes      Partners:   Male      Birth control/ protection:   Vasectomy, I.U.D.       Comment:   osmel           Pap Date: 21  Pap Result Notes: negative        MEDICAL HISTORY:  Past Medical History:   Diagnosis Date    Chronic rhinitis     Hx of breast biopsy     Pap smear for cervical cancer screening 04/15, , , 10/07,     negative hpv negative    Thalassemia minor     Vertigo     recent onset-intermittent       SURGICAL HISTORY:  Past Surgical History:   Procedure Laterality Date    Cyst removal      [facility]    Implantable breast prosthesis      Dereck biopsy stereo nodule 1 site left (cpt=19081)      benign    Dereck biopsy stereo nodule 1 site right (cpt=19081)  2017    [facility]    Needle biopsy left      benign    Other surgical history      breast implants    Removal of ovarian cyst(s) Left      breast biopsy 1 site left (cpt=19083) Left [id number]    benign       SOCIAL HISTORY:  Social History    Socioeconomic History      Marital status:       Spouse name: Not on file      Number of children: Not on file      Years of education: Not on file      Highest education level: Not on file    Occupational History      Not on file    Tobacco Use      Smoking status: Never      Smokeless tobacco: Never    Vaping Use      Vaping Use: Never used    Substance and Sexual Activity      Alcohol use: Yes        Comment: social      Drug use: No      Sexual activity: Yes        Partners: Male        Birth control/protection: Vasectomy, I.U.D. Comment: osmel    Other Topics      Concerns:        Caffeine Concern: Not Asked        Exercise: Not Asked        Seat Belt: Not Asked        Special Diet: Not Asked        Stress Concern: Not Asked        Weight Concern: Not Asked    Social History Narrative      Not on file    Social Determinants of Health  Financial Resource Strain: Not on file  Food Insecurity: Not on file  Transportation Needs: Not on file  Physical Activity: Not on file  Stress: Not on file  Social Connections: Not on file  Housing Stability: Not on file    FAMILY HISTORY:  Family History   Problem Relation Age of Onset    Other (thalsemmia minor) Father     Diabetes Mother     Endometriosis Mother     No Known Problems Daughter     No Known Problems Daughter     No Known Problems Maternal Grandmother     No Known Problems Maternal Grandfather     No Known Problems Paternal Grandmother     No Known Problems Paternal Grandfather     Pancreatic Cancer Brother     Other (Other) Brother     Breast Cancer Neg     Ovarian Cancer Neg     Uterine Cancer Neg     Colon Cancer Neg     Prostate Cancer Neg        MEDICATIONS:    Current Outpatient Medications:     atorvastatin 20 MG Oral Tab, Take 1 tablet (20 mg total) by mouth daily. , Disp: , Rfl:     ELDERBERRY OR, Take by mouth., Disp: , Rfl:     levonorgestrel (OSMEL) 13.5 MG Intrauterine IUD, 1 each by Intrauterine route one time. , Disp: , Rfl:     FOLIC ACID OR, Take by mouth., Disp: , Rfl:     Cholecalciferol (VITAMIN D) 50 MCG (2000 UT) Oral Cap, Take by mouth., Disp: , Rfl:     Albuterol Sulfate HFA (PROAIR HFA) 108 (90 Base) MCG/ACT Inhalation Aero Soln, Inhale 2 puffs into the lungs every 6 (six) hours as needed for Wheezing., Disp: 3 Inhaler, Rfl: 0    cetirizine 10 MG Oral Tab, Take 1 tablet (10 mg total) by mouth daily. , Disp: , Rfl:     ALLERGIES:    Seasonal                  Adhesive Tape           RASH    Comment:No problem with steri strips      Review of Systems:  Constitutional:  Denies fatigue, night sweats, hot flashes  Gastrointestinal:  denies heartburn, abdominal pain, diarrhea or constipation  Genitourinary:  denies dysuria, incontinence, abnormal vaginal discharge, vaginal itching  Skin/Breast:  Denies any breast pain, lumps, or discharge. Neurological:  denies headaches, extremity weakness or numbness. PHYSICAL EXAM:   Constitutional: well developed, well nourished  Head/Face: normocephalic  Neck/Thyroid: thyroid symmetric, no thyromegaly, no nodules, no adenopathy  Breast: normal without palpable masses, tenderness, asymmetry, nipple discharge, nipple retraction or skin changes  Abdomen:  soft, nontender, nondistended, no masses  Skin/Hair: no unusual rashes or bruises   Extremities: no edema, no cyanosis  Psychiatric:  Oriented to time, place, person and situation. Appropriate mood and affect    Pelvic Exam:  External Genitalia: normal appearance, hair distribution, and no lesions  Urethral Meatus:  normal in size, location, without lesions and prolapse  Bladder:  No fullness, masses or tenderness  Vagina:  Normal appearance without lesions, no abnormal discharge  Cervix:  Normal without tenderness on motion without lesions. Strings seen. Uterus: normal in size, contour, position, mobility, without tenderness  Adnexa: normal without masses or tenderness  Perineum: normal  Anus: no hemorroids     Assessment & Plan:  Carlos Fuentes was seen today for wellness visit.     Diagnoses and all orders for this visit:    Well woman exam with routine gynecological exam        Irregular menses.

## 2023-10-25 ENCOUNTER — TELEPHONE (OUTPATIENT)
Dept: HEMATOLOGY/ONCOLOGY | Facility: HOSPITAL | Age: 53
End: 2023-10-25

## 2023-11-01 ENCOUNTER — TELEPHONE (OUTPATIENT)
Dept: SURGERY | Facility: CLINIC | Age: 53
End: 2023-11-01

## 2023-11-01 NOTE — TELEPHONE ENCOUNTER
Received a message from Arvind Cotton that patient had an insurance question, LM for patient to call me back.

## 2023-11-01 NOTE — TELEPHONE ENCOUNTER
Called patient and discussed surgical plan of tissue expanders vs possible direct-to-implant. Also discussed implant size and projected second-stage surgical plan. Questions answered, patient understands.

## 2023-11-09 ENCOUNTER — TELEPHONE (OUTPATIENT)
Dept: SURGERY | Facility: CLINIC | Age: 53
End: 2023-11-09

## 2023-11-09 NOTE — TELEPHONE ENCOUNTER
Spoke to [employer] RHONDA Duque at Dr. Marah Boyle office (pt PCP) she will fax EKG tracing as well as results review of the abnormal CBC.

## 2023-11-10 ENCOUNTER — TELEPHONE (OUTPATIENT)
Dept: SURGERY | Facility: CLINIC | Age: 53
End: 2023-11-10

## 2023-11-10 NOTE — TELEPHONE ENCOUNTER
Received message that patient had surgery questions. Called patient, answered questions to the best of my ability.

## 2023-11-16 ENCOUNTER — HOSPITAL ENCOUNTER (OUTPATIENT)
Facility: HOSPITAL | Age: 53
Setting detail: HOSPITAL OUTPATIENT SURGERY
Discharge: HOME | End: 2023-11-16
Attending: SURGERY | Admitting: SURGERY
Payer: COMMERCIAL

## 2023-11-16 ENCOUNTER — ANESTHESIA (OUTPATIENT)
Dept: SURGERY | Facility: HOSPITAL | Age: 53
End: 2023-11-16
Payer: COMMERCIAL

## 2023-11-16 ENCOUNTER — ANESTHESIA EVENT (OUTPATIENT)
Dept: SURGERY | Facility: HOSPITAL | Age: 53
End: 2023-11-16
Payer: COMMERCIAL

## 2023-11-16 VITALS
RESPIRATION RATE: 14 BRPM | HEART RATE: 52 BPM | SYSTOLIC BLOOD PRESSURE: 108 MMHG | DIASTOLIC BLOOD PRESSURE: 64 MMHG | BODY MASS INDEX: 21.18 KG/M2 | WEIGHT: 134.94 LBS | TEMPERATURE: 98 F | OXYGEN SATURATION: 97 % | HEIGHT: 67 IN

## 2023-11-16 DIAGNOSIS — N60.91 ATYPICAL DUCTAL HYPERPLASIA OF RIGHT BREAST: ICD-10-CM

## 2023-11-16 DIAGNOSIS — N60.99 ATYPICAL HYPERPLASIA OF BREAST: Primary | ICD-10-CM

## 2023-11-16 LAB — B-HCG UR QL: NEGATIVE

## 2023-11-16 PROCEDURE — 88304 TISSUE EXAM BY PATHOLOGIST: CPT | Performed by: SURGERY

## 2023-11-16 PROCEDURE — 0HPT0JZ REMOVAL OF SYNTHETIC SUBSTITUTE FROM RIGHT BREAST, OPEN APPROACH: ICD-10-PCS | Performed by: SURGERY

## 2023-11-16 PROCEDURE — 76942 ECHO GUIDE FOR BIOPSY: CPT | Performed by: STUDENT IN AN ORGANIZED HEALTH CARE EDUCATION/TRAINING PROGRAM

## 2023-11-16 PROCEDURE — 0HHV0NZ INSERTION OF TISSUE EXPANDER INTO BILATERAL BREAST, OPEN APPROACH: ICD-10-PCS | Performed by: SURGERY

## 2023-11-16 PROCEDURE — 0HTV0ZZ RESECTION OF BILATERAL BREAST, OPEN APPROACH: ICD-10-PCS | Performed by: SURGERY

## 2023-11-16 PROCEDURE — 0HPU0JZ REMOVAL OF SYNTHETIC SUBSTITUTE FROM LEFT BREAST, OPEN APPROACH: ICD-10-PCS | Performed by: SURGERY

## 2023-11-16 PROCEDURE — 0HRV0JZ REPLACEMENT OF BILATERAL BREAST WITH SYNTHETIC SUBSTITUTE, OPEN APPROACH: ICD-10-PCS | Performed by: SURGERY

## 2023-11-16 PROCEDURE — 88305 TISSUE EXAM BY PATHOLOGIST: CPT | Performed by: SURGERY

## 2023-11-16 PROCEDURE — 8E0W3EZ FLUORESCENCE GUIDED PROCEDURE OF TRUNK REGION, PERCUTANEOUS APPROACH: ICD-10-PCS | Performed by: SURGERY

## 2023-11-16 PROCEDURE — 81025 URINE PREGNANCY TEST: CPT

## 2023-11-16 DEVICE — GRAFT HUM TISS THK2-2.8MM THCK L PERF CNTOUR: Type: IMPLANTABLE DEVICE | Site: BREAST | Status: FUNCTIONAL

## 2023-11-16 DEVICE — NATRELLE TE SMOOTH 133S-MX-13-T (US)
Type: IMPLANTABLE DEVICE | Site: BREAST | Status: FUNCTIONAL
Brand: NATRELLE 133S TISSUE EXPANDERS

## 2023-11-16 RX ORDER — ONDANSETRON 2 MG/ML
INJECTION INTRAMUSCULAR; INTRAVENOUS AS NEEDED
Status: DISCONTINUED | OUTPATIENT
Start: 2023-11-16 | End: 2023-11-16 | Stop reason: SURG

## 2023-11-16 RX ORDER — HYDROCODONE BITARTRATE AND ACETAMINOPHEN 5; 325 MG/1; MG/1
1-2 TABLET ORAL EVERY 4 HOURS PRN
Qty: 40 TABLET | Refills: 0 | Status: SHIPPED | OUTPATIENT
Start: 2023-11-16

## 2023-11-16 RX ORDER — LIDOCAINE HYDROCHLORIDE 10 MG/ML
INJECTION, SOLUTION EPIDURAL; INFILTRATION; INTRACAUDAL; PERINEURAL AS NEEDED
Status: DISCONTINUED | OUTPATIENT
Start: 2023-11-16 | End: 2023-11-16 | Stop reason: SURG

## 2023-11-16 RX ORDER — DEXAMETHASONE SODIUM PHOSPHATE 4 MG/ML
VIAL (ML) INJECTION AS NEEDED
Status: DISCONTINUED | OUTPATIENT
Start: 2023-11-16 | End: 2023-11-16 | Stop reason: SURG

## 2023-11-16 RX ORDER — NALOXONE HYDROCHLORIDE 0.4 MG/ML
0.08 INJECTION, SOLUTION INTRAMUSCULAR; INTRAVENOUS; SUBCUTANEOUS AS NEEDED
Status: DISCONTINUED | OUTPATIENT
Start: 2023-11-16 | End: 2023-11-16

## 2023-11-16 RX ORDER — DEXAMETHASONE SODIUM PHOSPHATE 10 MG/ML
INJECTION, SOLUTION INTRAMUSCULAR; INTRAVENOUS AS NEEDED
Status: DISCONTINUED | OUTPATIENT
Start: 2023-11-16 | End: 2023-11-16 | Stop reason: SURG

## 2023-11-16 RX ORDER — SODIUM CHLORIDE, SODIUM LACTATE, POTASSIUM CHLORIDE, CALCIUM CHLORIDE 600; 310; 30; 20 MG/100ML; MG/100ML; MG/100ML; MG/100ML
INJECTION, SOLUTION INTRAVENOUS CONTINUOUS
Status: DISCONTINUED | OUTPATIENT
Start: 2023-11-16 | End: 2023-11-16

## 2023-11-16 RX ORDER — CEFAZOLIN SODIUM/WATER 2 G/20 ML
2 SYRINGE (ML) INTRAVENOUS ONCE
Status: COMPLETED | OUTPATIENT
Start: 2023-11-16 | End: 2023-11-16

## 2023-11-16 RX ORDER — INDOCYANINE GREEN AND WATER 25 MG
KIT INJECTION AS NEEDED
Status: DISCONTINUED | OUTPATIENT
Start: 2023-11-16 | End: 2023-11-16 | Stop reason: SURG

## 2023-11-16 RX ORDER — PHENYLEPHRINE HCL 10 MG/ML
VIAL (ML) INJECTION AS NEEDED
Status: DISCONTINUED | OUTPATIENT
Start: 2023-11-16 | End: 2023-11-16 | Stop reason: SURG

## 2023-11-16 RX ORDER — BUPIVACAINE HYDROCHLORIDE 2.5 MG/ML
INJECTION, SOLUTION EPIDURAL; INFILTRATION; INTRACAUDAL AS NEEDED
Status: DISCONTINUED | OUTPATIENT
Start: 2023-11-16 | End: 2023-11-16 | Stop reason: SURG

## 2023-11-16 RX ORDER — ACETAMINOPHEN 500 MG
1000 TABLET ORAL ONCE AS NEEDED
Status: DISCONTINUED | OUTPATIENT
Start: 2023-11-16 | End: 2023-11-16

## 2023-11-16 RX ORDER — HYDROMORPHONE HYDROCHLORIDE 1 MG/ML
0.4 INJECTION, SOLUTION INTRAMUSCULAR; INTRAVENOUS; SUBCUTANEOUS EVERY 5 MIN PRN
Status: DISCONTINUED | OUTPATIENT
Start: 2023-11-16 | End: 2023-11-16

## 2023-11-16 RX ORDER — NEOSTIGMINE METHYLSULFATE 1 MG/ML
INJECTION, SOLUTION INTRAVENOUS AS NEEDED
Status: DISCONTINUED | OUTPATIENT
Start: 2023-11-16 | End: 2023-11-16 | Stop reason: SURG

## 2023-11-16 RX ORDER — PROCHLORPERAZINE EDISYLATE 5 MG/ML
5 INJECTION INTRAMUSCULAR; INTRAVENOUS EVERY 8 HOURS PRN
Status: DISCONTINUED | OUTPATIENT
Start: 2023-11-16 | End: 2023-11-16

## 2023-11-16 RX ORDER — HYDROCODONE BITARTRATE AND ACETAMINOPHEN 5; 325 MG/1; MG/1
2 TABLET ORAL ONCE AS NEEDED
Status: DISCONTINUED | OUTPATIENT
Start: 2023-11-16 | End: 2023-11-16

## 2023-11-16 RX ORDER — ONDANSETRON 4 MG/1
4 TABLET, FILM COATED ORAL EVERY 8 HOURS PRN
Qty: 12 TABLET | Refills: 0 | Status: SHIPPED | OUTPATIENT
Start: 2023-11-16

## 2023-11-16 RX ORDER — LABETALOL HYDROCHLORIDE 5 MG/ML
5 INJECTION, SOLUTION INTRAVENOUS EVERY 5 MIN PRN
Status: DISCONTINUED | OUTPATIENT
Start: 2023-11-16 | End: 2023-11-16

## 2023-11-16 RX ORDER — MIDAZOLAM HYDROCHLORIDE 1 MG/ML
INJECTION INTRAMUSCULAR; INTRAVENOUS AS NEEDED
Status: DISCONTINUED | OUTPATIENT
Start: 2023-11-16 | End: 2023-11-16 | Stop reason: SURG

## 2023-11-16 RX ORDER — HYDROCODONE BITARTRATE AND ACETAMINOPHEN 5; 325 MG/1; MG/1
1 TABLET ORAL ONCE AS NEEDED
Status: DISCONTINUED | OUTPATIENT
Start: 2023-11-16 | End: 2023-11-16

## 2023-11-16 RX ORDER — ROCURONIUM BROMIDE 10 MG/ML
INJECTION, SOLUTION INTRAVENOUS AS NEEDED
Status: DISCONTINUED | OUTPATIENT
Start: 2023-11-16 | End: 2023-11-16 | Stop reason: SURG

## 2023-11-16 RX ORDER — HYDROMORPHONE HYDROCHLORIDE 1 MG/ML
INJECTION, SOLUTION INTRAMUSCULAR; INTRAVENOUS; SUBCUTANEOUS
Status: COMPLETED
Start: 2023-11-16 | End: 2023-11-16

## 2023-11-16 RX ORDER — ONDANSETRON 2 MG/ML
4 INJECTION INTRAMUSCULAR; INTRAVENOUS EVERY 6 HOURS PRN
Status: DISCONTINUED | OUTPATIENT
Start: 2023-11-16 | End: 2023-11-16

## 2023-11-16 RX ORDER — CEPHALEXIN 500 MG/1
500 CAPSULE ORAL 4 TIMES DAILY
Qty: 40 CAPSULE | Refills: 1 | Status: SHIPPED | OUTPATIENT
Start: 2023-11-16

## 2023-11-16 RX ORDER — HYDROMORPHONE HYDROCHLORIDE 1 MG/ML
0.6 INJECTION, SOLUTION INTRAMUSCULAR; INTRAVENOUS; SUBCUTANEOUS EVERY 5 MIN PRN
Status: DISCONTINUED | OUTPATIENT
Start: 2023-11-16 | End: 2023-11-16

## 2023-11-16 RX ORDER — LIDOCAINE HYDROCHLORIDE ANHYDROUS AND DEXTROSE MONOHYDRATE .8; 5 G/100ML; G/100ML
INJECTION, SOLUTION INTRAVENOUS CONTINUOUS PRN
Status: DISCONTINUED | OUTPATIENT
Start: 2023-11-16 | End: 2023-11-16 | Stop reason: SURG

## 2023-11-16 RX ORDER — KETAMINE HYDROCHLORIDE 50 MG/ML
INJECTION, SOLUTION, CONCENTRATE INTRAMUSCULAR; INTRAVENOUS AS NEEDED
Status: DISCONTINUED | OUTPATIENT
Start: 2023-11-16 | End: 2023-11-16 | Stop reason: SURG

## 2023-11-16 RX ORDER — DOCUSATE SODIUM 100 MG/1
100 CAPSULE, LIQUID FILLED ORAL 2 TIMES DAILY
Qty: 30 CAPSULE | Refills: 1 | Status: SHIPPED | OUTPATIENT
Start: 2023-11-16

## 2023-11-16 RX ORDER — MEPERIDINE HYDROCHLORIDE 25 MG/ML
12.5 INJECTION INTRAMUSCULAR; INTRAVENOUS; SUBCUTANEOUS AS NEEDED
Status: DISCONTINUED | OUTPATIENT
Start: 2023-11-16 | End: 2023-11-16

## 2023-11-16 RX ORDER — HYDROMORPHONE HYDROCHLORIDE 1 MG/ML
0.2 INJECTION, SOLUTION INTRAMUSCULAR; INTRAVENOUS; SUBCUTANEOUS EVERY 5 MIN PRN
Status: DISCONTINUED | OUTPATIENT
Start: 2023-11-16 | End: 2023-11-16

## 2023-11-16 RX ORDER — MIDAZOLAM HYDROCHLORIDE 1 MG/ML
1 INJECTION INTRAMUSCULAR; INTRAVENOUS EVERY 5 MIN PRN
Status: DISCONTINUED | OUTPATIENT
Start: 2023-11-16 | End: 2023-11-16

## 2023-11-16 RX ORDER — ACETAMINOPHEN 500 MG
1000 TABLET ORAL ONCE
Status: DISCONTINUED | OUTPATIENT
Start: 2023-11-16 | End: 2023-11-16 | Stop reason: HOSPADM

## 2023-11-16 RX ORDER — DIPHENHYDRAMINE HYDROCHLORIDE 50 MG/ML
12.5 INJECTION INTRAMUSCULAR; INTRAVENOUS AS NEEDED
Status: DISCONTINUED | OUTPATIENT
Start: 2023-11-16 | End: 2023-11-16

## 2023-11-16 RX ORDER — GLYCOPYRROLATE 0.2 MG/ML
INJECTION, SOLUTION INTRAMUSCULAR; INTRAVENOUS AS NEEDED
Status: DISCONTINUED | OUTPATIENT
Start: 2023-11-16 | End: 2023-11-16 | Stop reason: SURG

## 2023-11-16 RX ADMIN — GLYCOPYRROLATE 0.4 MG: 0.2 INJECTION, SOLUTION INTRAMUSCULAR; INTRAVENOUS at 11:02:00

## 2023-11-16 RX ADMIN — BUPIVACAINE HYDROCHLORIDE 20 ML: 2.5 INJECTION, SOLUTION EPIDURAL; INFILTRATION; INTRACAUDAL at 07:54:00

## 2023-11-16 RX ADMIN — SODIUM CHLORIDE, SODIUM LACTATE, POTASSIUM CHLORIDE, CALCIUM CHLORIDE: 600; 310; 30; 20 INJECTION, SOLUTION INTRAVENOUS at 07:40:00

## 2023-11-16 RX ADMIN — DEXAMETHASONE SODIUM PHOSPHATE 8 MG: 4 MG/ML VIAL (ML) INJECTION at 07:55:00

## 2023-11-16 RX ADMIN — DEXAMETHASONE SODIUM PHOSPHATE 2 MG: 10 INJECTION, SOLUTION INTRAMUSCULAR; INTRAVENOUS at 07:54:00

## 2023-11-16 RX ADMIN — DEXAMETHASONE SODIUM PHOSPHATE 2 MG: 10 INJECTION, SOLUTION INTRAMUSCULAR; INTRAVENOUS at 07:52:00

## 2023-11-16 RX ADMIN — ONDANSETRON 4 MG: 2 INJECTION INTRAMUSCULAR; INTRAVENOUS at 08:37:00

## 2023-11-16 RX ADMIN — INDOCYANINE GREEN AND WATER 7.5 MG: 25 MG KIT INJECTION at 09:45:00

## 2023-11-16 RX ADMIN — LIDOCAINE HYDROCHLORIDE 5 ML: 10 INJECTION, SOLUTION EPIDURAL; INFILTRATION; INTRACAUDAL; PERINEURAL at 07:46:00

## 2023-11-16 RX ADMIN — SODIUM CHLORIDE, SODIUM LACTATE, POTASSIUM CHLORIDE, CALCIUM CHLORIDE: 600; 310; 30; 20 INJECTION, SOLUTION INTRAVENOUS at 09:50:00

## 2023-11-16 RX ADMIN — NEOSTIGMINE METHYLSULFATE 3 MG: 1 INJECTION, SOLUTION INTRAVENOUS at 11:02:00

## 2023-11-16 RX ADMIN — BUPIVACAINE HYDROCHLORIDE 20 ML: 2.5 INJECTION, SOLUTION EPIDURAL; INFILTRATION; INTRACAUDAL at 07:52:00

## 2023-11-16 RX ADMIN — INDOCYANINE GREEN AND WATER 7.5 MG: 25 MG KIT INJECTION at 10:17:00

## 2023-11-16 RX ADMIN — LIDOCAINE HYDROCHLORIDE ANHYDROUS AND DEXTROSE MONOHYDRATE 2 MG/KG/HR: .8; 5 INJECTION, SOLUTION INTRAVENOUS at 08:00:00

## 2023-11-16 RX ADMIN — PHENYLEPHRINE HCL 100 MCG: 10 MG/ML VIAL (ML) INJECTION at 08:32:00

## 2023-11-16 RX ADMIN — PHENYLEPHRINE HCL 100 MCG: 10 MG/ML VIAL (ML) INJECTION at 09:49:00

## 2023-11-16 RX ADMIN — PHENYLEPHRINE HCL 100 MCG: 10 MG/ML VIAL (ML) INJECTION at 09:25:00

## 2023-11-16 RX ADMIN — MIDAZOLAM HYDROCHLORIDE 2 MG: 1 INJECTION INTRAMUSCULAR; INTRAVENOUS at 07:38:00

## 2023-11-16 RX ADMIN — KETAMINE HYDROCHLORIDE 50 MG: 50 INJECTION, SOLUTION, CONCENTRATE INTRAMUSCULAR; INTRAVENOUS at 07:46:00

## 2023-11-16 RX ADMIN — ROCURONIUM BROMIDE 10 MG: 10 INJECTION, SOLUTION INTRAVENOUS at 09:20:00

## 2023-11-16 RX ADMIN — PHENYLEPHRINE HCL 100 MCG: 10 MG/ML VIAL (ML) INJECTION at 08:54:00

## 2023-11-16 RX ADMIN — CEFAZOLIN SODIUM/WATER 2 G: 2 G/20 ML SYRINGE (ML) INTRAVENOUS at 07:50:00

## 2023-11-16 RX ADMIN — SODIUM CHLORIDE, SODIUM LACTATE, POTASSIUM CHLORIDE, CALCIUM CHLORIDE: 600; 310; 30; 20 INJECTION, SOLUTION INTRAVENOUS at 11:02:00

## 2023-11-16 RX ADMIN — ROCURONIUM BROMIDE 40 MG: 10 INJECTION, SOLUTION INTRAVENOUS at 07:46:00

## 2023-11-16 NOTE — ANESTHESIA PROCEDURE NOTES
Airway  Date/Time: 11/16/2023 7:48 AM  Urgency: elective    Airway not difficult    General Information and Staff    Patient location during procedure: OR  Anesthesiologist: Cortes Bhatia MD  Performed: anesthesiologist   Performed by: Cortes Bhatia MD  Authorized by: Cortes Bhatia MD      Indications and Patient Condition  Indications for airway management: anesthesia  Sedation level: deep  Preoxygenated: yes  Patient position: sniffing  Mask difficulty assessment: 1 - vent by mask    Final Airway Details  Final airway type: endotracheal airway      Successful airway: ETT  Cuffed: yes   Successful intubation technique: direct laryngoscopy  Facilitating devices/methods: intubating stylet  Endotracheal tube insertion site: oral  Blade: Jesus Manuel  Blade size: #3  ETT size (mm): 6.5    Cormack-Lehane Classification: grade I - full view of glottis  Placement verified by: capnometry   Measured from: lips  ETT to lips (cm): 21  Number of attempts at approach: 1

## 2023-11-16 NOTE — BRIEF OP NOTE
Pre-Operative Diagnosis: Atypical ductal hyperplasia of right breast [N60.91]     Post-Operative Diagnosis: * No post-op diagnosis entered *      Procedure Performed:   Prophylactic bilateral breast nipple sparing mastectomies Lakeshia Pall) Immediate breast reconstruction with placement of bilateral breast tissue expanders with acellular dermal matrix, possible direct to bilateral breast implants (Tj)    Surgeon(s) and Role:  Panel 1:     Mohamud Graham MD - Primary  Panel 2:     * Triny Hansen MD - Primary    Assistant(s):  Surgical Assistant.: Torrey Mariee;  Nayeli Ashraf CSA     Surgical Findings: See dictation     Specimen: Bilateral breast capsule     EBL: 10ml      Lisy Acosta MD  11/16/2023  11:23 AM

## 2023-11-16 NOTE — BRIEF OP NOTE
Pre-Operative Diagnosis: Atypical ductal hyperplasia of right breast [N60.91]     Post-Operative Diagnosis: * No post-op diagnosis entered *      Procedure Performed:   Prophylactic bilateral breast nipple sparing mastectomies Adam Nettles     Surgeon(s) and Role:  Panel 1:     Michelle Maynard MD - Primary    Assistant(s):  Surgical Assistant.:Xin Boyer, MAHSA     Surgical Findings: N/A     Specimen: Bilateral breasts     Estimated Blood Loss: 50cc    Brianna Merino MD  11/16/2023  3:02 PM

## 2023-11-16 NOTE — ANESTHESIA PROCEDURE NOTES
Regional Block    Date/Time: 2023 7:50 AM    Performed by: April Cadena MD  Authorized by: April Cadena MD      General Information and Staff    Start Time:  2023 7:50 AM  End Time:  2023 7:54 AM  Anesthesiologist:  April Cadena MD  Performed by: Anesthesiologist  Patient Location:  OR    Block Placement: Post Induction  Site Identification: real time ultrasound guided and image stored and retrievable    Block site/laterality marked before start: site marked  Reason for Block: at surgeon's request and post-op pain management    Preanesthetic Checklist: 2 patient identifers, IV checked, risks and benefits discussed, monitors and equipment checked, pre-op evaluation, timeout performed, anesthesia consent, sterile technique used, no prohibitive neurological deficits and no local skin infection at insertion site      Procedure Details    Patient Position:  Supine  Prep: ChloraPrep    Monitoring:  Cardiac monitor, continuous pulse ox and blood pressure cuff  Anesthesia block type: serratus anterior blocks. Laterality:  Bilateral  Injection Technique:  Single-shot    Needle    Needle Type:  Short-bevel and echogenic  Needle Gauge:  21 G  Needle Length:  100 mm  Needle Localization:  Ultrasound guidance  Reason for Ultrasound Use: appropriate spread of the medication was noted in real time and no ultrasound evidence of intravascular and/or intraneural injection            Assessment    Injection Assessment:  Good spread noted, negative resistance, negative aspiration for heme, incremental injection and low pressure  Heart Rate Change: No    - Patient tolerated block procedure well without evidence of immediate block related complications.      Medications  2023 7:50 AM      Additional Comments    Medication:  Bupivacaine 0.25% 20mL with 2 mg PF dexamethasone

## 2023-11-16 NOTE — PROGRESS NOTES
Plan for bilateral NS-mastectomy by  and immediate reconstruction with tissue expander vs silicone implant and acellular dermal matrix reviewed with patient. The risks of surgery including but not limited to bleeding, infection, scarring, delayed wound healing, seroma, asymmetry, implant infection/extrusion requiring removal, expander deflation, injury to adjacent structures, capsular contracture, ALCL, and need for further surgery were reviewed. The expected post-operative course was discussed. Questions were answered to the patient's satisfaction. No guarantees as to outcome were offered. The patient expresses understanding and wishes to proceed.

## 2023-11-17 ENCOUNTER — TELEPHONE (OUTPATIENT)
Dept: SURGERY | Facility: CLINIC | Age: 53
End: 2023-11-17

## 2023-11-17 NOTE — OPERATIVE REPORT
[address]    PATIENT'S NAME: Marylee Divers   ATTENDING PHYSICIAN: Latasha Aguirre. Pearlean Hammans, M.D. OPERATING PHYSICIAN: Larry Connor M.D. 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 PROCEDURE:  1. Intraoperative inspection of mastectomy skin flap perfusion with indocyanine green laser imaging. 2.   Bilateral immediate breast reconstruction with tissue expander and acellular dermal matrix. ASSISTANTS:    1.   ROBERT Caro  2. James Fu [place] student    ANESTHESIA:  General.    ESTIMATED BLOOD LOSS:  10 mL. COMPLICATIONS:  None. INDICATIONS:  Patient is a 61-year-old female referred with a diagnosis of atypical ductal hyperplasia of her right breast.  The patient has a history of bilateral submuscular breast augmentation in the past.  The patient elected to undergo bilateral nipple-sparing mastectomy and opted to proceed with immediate implant-based reconstruction. FINDINGS:  Bilateral breasts reconstructed with Natrelle 133S tissue expander, 500 mL, reference [id number], on the right serial #[id number], on the left serial #[id number]. The tissue expanders were placed in the prepectoral position, and no intraoperative expansion was performed. OPERATIVE TECHNIQUE:  Informed consent was obtained from the patient. The risks, benefits, and alternatives were reviewed with the patient preoperatively. She expressed understanding and wished to proceed. The patient was marked in the preoperative holding area in the upright position.   The midline and inframammary folds were marked. An inframammary fold approach was marked in conjunction with Dr. Lauran Opitz. The patient was then taken to the operating room by Dr. Kristan Stoddard team, where she underwent bilateral nipple-sparing mastectomy. Once Dr. Kristan Stoddard portion of the procedure was completed, the plastic surgery portion of the procedure began. The mastectomy skin flaps were inspected and appeared to be of suitable thickness and viability to facilitate immediate reconstruction. Given the possibility of direct to implant reconstruction, indocyanine green laser imaging of the mastectomy skin flaps was performed with sizers in place. This showed good perfusion with the exception of the nipples bilaterally which were noted to have absent perfusion. Given these findings, the pocket was rinsed with warm saline irrigation, and hemostasis was secured with electrocautery. The imaging was then repeated after approximately 15 minutes, and persistent hypoperfusion of the nipples was noted bilaterally. As such, decision was made to proceed with immediate tissue expander reconstruction. With the sizers in the submuscular position, significant irregularities were noted on the superficial aspect of the pectoralis muscle which were visible through the skin. Hence, decision was made to place the tissue expanders in the prepectoral position. The redundant capsule in the lower pole bilaterally was excised and sent for permanent pathologic analysis. The inferior aspect of the pectoralis muscle was then resecured along the inframammary fold with interrupted 2-0 Vicryl figure-of-eight sutures. Once this had been achieved, gloves were changed, and attention was turned to the back table where 4 sheets of large contoured perforated AlloDerm were brought on the field and bathed in saline irrigation. The 2 sheets of AlloDerm were secured along the long axis with a running 2-0 PDS suture. A posterior pursestring of 3-0 PDS was then placed.   The tissue expander was then brought on the field and immediately bathed in antibiotic irrigation. They were checked for integrity and noted to be intact. The tissue expanders were accessed and filled to their capacity. The AlloDerm was draped over the anterior surface of the tissue expanders, and the posterior pursestring was tied down. Fenestrations were then created for passage of the suture tabs. Next, the surgical sites were isolated with Dmitriy Sleight, and gloves were changed. The tissue expander and AlloDerm construct was delivered via the incisions and sutured to the chest wall with interrupted 2-0 Prolene sutures. Two 15-Brazilian Nima drains were placed on each side, 1 in the submuscular position, 1 in the prepectoral space. The incisions were then repaired in a layered fashion with 3-0 Vicryl deep sutures and 4-0 Monocryl subcuticular suture. The tissue expanders were then accessed, and all air was aspirated. The incisions were closed with Dermabond and Steri-Strips. Fluff gauze and a surgical bra were applied. Biopatches and Tegaderms were placed on the drain sites. The patient was awakened, extubated, and taken to the recovery area in stable condition. There were no operative complications. All needle, sponge, and instrument counts were correct at the end of the procedure. Dictated By Kusum Parker M.D.  d: 11/16/2023 11:57:15  t: 11/16/2023 18:58:52  [facility] [id number]  MYRANDA/

## 2023-11-17 NOTE — OPERATIVE REPORT
[facility]    PATIENT'S NAME: Jhon Varner   ATTENDING PHYSICIAN: Bryson Webber M.D. OPERATING PHYSICIAN: Bryson Webber M.D. PATIENT ACCOUNT#:   [de-identified]    LOCATION:  [facility] 10 EDWP 10  MEDICAL RECORD #:   [id number]       YOB: 1970  ADMISSION DATE:       11/16/2023      OPERATION DATE:  11/16/2023    OPERATIVE REPORT    PREOPERATIVE DIAGNOSIS:  Atypical ductal hyperplasia of the right breast with high risk of development of breast cancer. POSTOPERATIVE DIAGNOSIS:  Atypical ductal hyperplasia of the right breast with high risk of development of breast cancer. PROCEDURE:  Bilateral nipple-sparing mastectomies. Reconstruction be dictated separately by Dr. Ed Etienne. ASSISTANT:  Ra Suárez CSA    ESTIMATED BLOOD LOSS:  For my portion of procedure is 50 mL. DRAINS:  Placed per Plastic Surgery. COMPLICATIONS:  No immediate complications. DISPOSITION:  Patient remains in OR for reconstruction with Dr. Amol Lang. INDICATIONS:  This patient is a 72-year-old female initially detected with a right imaging detected concern. She had a history of bilateral breast augmentation remotely. She underwent excision of the atypical ductal hyperplasia back in June 2023. For a maximal risk reduction due to her disinterest in high-risk surveillance imaging as well as chemoprevention, she is electing for a bilateral mastectomy. Because of her prior breast augmentation and prior scar tissue as well as because of her desire for nipple and skin preservation and immediate reconstruction, the unusual procedure of nipple-sparing mastectomy instead of the usual procedure of simple mastectomy was necessary. This also required additional time and increased efforts and complexity due to the patient's prior scar tissue, prior breast implants, and prior surgery, as well as to accommodate adequate skin perfusion to allow for the immediate reconstruction.   This also required surgical assistance in order to allow for adequate exposure to complete this operation through a challenging incision. Risks and possible complications were discussed with the patient including, but not limited to, infection, bleeding, injury to surrounding structures, possible need for reoperation. She agreed to the proposed surgery. OPERATIVE TECHNIQUE:  The patient was brought to the OR, placed in supine position, properly padded and secured, given a dose of IV antibiotics, and sequential compression devices were applied to her legs for DVT prophylaxis. General anesthesia was induced. Bilateral pectoral nerve blocks were placed for anesthesia. Bilateral breasts were prepped and draped in the usual sterile fashion. Bilateral inframammary incisions had been marked preoperatively by Dr. Aquilino Jackson. We started with the right and incised with a 15-blade knife through the skin where we encountered scar tissue and the capsule surrounding her in place augmented breast implant. Using sharp dissection and electrocautery, mastectomy flaps were then raised to the borders of the clavicle, sternum, inframammary fold, and latissimus tendon laterally. The right breast was dissected off the underlying muscle with electrocautery,  including en bloc excision of the pectoralis fascia, oriented with a short stitch at the base of the nipple, long stitch at the axillary tail, and sent for routine analysis. The capsule was incised at the site of her prior breast implant and the right breast implant removed and placed on back table for inspection per Plastic Surgery. Wound was irrigated and hemostasis assured with electrocautery and hemoclips and moist laparotomy pad was left in place for the reconstruction. Attention was taken toward the contralateral left breast.  A symmetrical inframammary incision was incised with a 15-blade knife for the skin.   Using sharp dissection and electrocautery, mastectomy flaps were raised to the borders of the clavicle, sternum, inframammary fold, and latissimus tendon laterally. The left breast was carefully dissected off the underlying muscle with electrocautery including en bloc excision of the pectoralis fascia. It was oriented with a short stitch at the base of the nipple, long stitch at the axillary tail and sent for routine analysis. This wound was again irrigated. Hemostasis was assured. The capsule surrounding the implant was incised, implant removed, placed on back table for inspection by the plastic surgeon. A formal count was performed demonstrating all counts to be correct at the conclusion of my portion of the procedure. Patient was hemodynamically stable upon my exit from the OR and estimated blood loss for my portion was approximately 50 mL. The remainder of this procedure will be dictated separately by Dr. Donny Benitez. Dictated By Aparna Rutledge.  Chandler Granado M.D.  d: 11/16/2023 15:06:53  t: 11/16/2023 20:40:17  [facility] [id number]  [facility]/    cc: CHARMAINE Mijares M.D.

## 2023-11-17 NOTE — TELEPHONE ENCOUNTER
Called patient and spoke about the timing of the expander process as well as the healing process. Patient understands, and all questions are answered.

## 2023-11-21 ENCOUNTER — OFFICE VISIT (OUTPATIENT)
Dept: SURGERY | Facility: CLINIC | Age: 53
End: 2023-11-21
Payer: COMMERCIAL

## 2023-11-21 VITALS
RESPIRATION RATE: 15 BRPM | BODY MASS INDEX: 21 KG/M2 | SYSTOLIC BLOOD PRESSURE: 129 MMHG | WEIGHT: 134 LBS | TEMPERATURE: 98 F | DIASTOLIC BLOOD PRESSURE: 85 MMHG | OXYGEN SATURATION: 99 % | HEART RATE: 75 BPM

## 2023-11-21 DIAGNOSIS — R92.2 DENSE BREAST: ICD-10-CM

## 2023-11-21 DIAGNOSIS — R92.30 DENSE BREAST: ICD-10-CM

## 2023-11-21 DIAGNOSIS — Z91.89 AT HIGH RISK FOR BREAST CANCER: ICD-10-CM

## 2023-11-21 DIAGNOSIS — N60.99 ATYPICAL HYPERPLASIA OF BREAST: Primary | ICD-10-CM

## 2023-11-22 ENCOUNTER — OFFICE VISIT (OUTPATIENT)
Dept: SURGERY | Facility: CLINIC | Age: 53
End: 2023-11-22
Payer: COMMERCIAL

## 2023-11-22 DIAGNOSIS — Z90.13 ABSENCE OF BREAST, BILATERAL: Primary | ICD-10-CM

## 2023-11-22 PROCEDURE — 99024 POSTOP FOLLOW-UP VISIT: CPT

## 2023-11-22 RX ORDER — CEPHALEXIN 500 MG/1
500 CAPSULE ORAL 4 TIMES DAILY
Qty: 40 CAPSULE | Refills: 1 | Status: SHIPPED | OUTPATIENT
Start: 2023-11-22

## 2023-11-22 NOTE — PROGRESS NOTES
Melodie Casas is a 48year old female who presents today for a follow-up after bilateral nipple-sparing mastectomies (Dr. Delta Gar) and intraoperative inspection of mastectomy skin flap perfusion with indocyanine green laser imaging, bilateral immediate breast reconstruction with tissue expander (Bilateral  Natrelle 133S tissue expander, 500 mL with no intraoperative air) and acellular dermal matrix with Dr. Frandy Lozada on 11/16/2023. She denies fever and chills. She denies nausea, vomiting, diarrhea or constipation. Her pain is controlled. Physical Exam     Breast: Bilateral breast incisions are clean, dry, intact. There is no evidence of hematoma or seroma bilaterally. The bilateral mastectomy skin is without erythema. Bilateral nipples are viable. The left breast has a 1 cm area of superficial skin breakdown consistent with skin sensitivity to Steri-Strips. No evidence of open wound or necrosis. Bilateral breast drain sites are clean, dry, intact. Serous sanguinous fluid present. There were no vitals filed for this visit. Assessment and Plan     Leonila Christine is doing well s/p bilateral nipple-sparing mastectomies (Dr. Delta Gar) and intraoperative inspection of mastectomy skin flap perfusion with indocyanine green laser imaging, bilateral immediate breast reconstruction with tissue expander (Bilateral  Natrelle 133S tissue expander, 500 mL with no intraoperative air) and acellular dermal matrix with Dr. Frandy Lozada on 11/16/2023. Patient is here for wound check and drain removal.  1 drain from each breast was removed today due to low volume output. The patient tolerated this well. Neosporin, 2 x 2, Tegaderm was placed over the sites. 1 drain was left in place in each breast today due to high volume output. Drain care and parameters were reviewed with the patient. She will continue to take her oral antibiotics until all drains have been removed.   She will refrain from showering above her umbilicus until all drains are removed. The remaining drain sites were redressed with a new Biopatch and Tegaderm. The right breast incision was redressed with new Steri-Strips. The left breast incision was redressed with Neosporin, Xeroform, Telfa due to the patient's skin sensitivity to Steri-Strips on this side. The patient will change this dressing daily. Dressing care and instructions were reviewed with the patient and her daughter, supplies were given. She will follow-up with me on Monday for possible removal of the remaining drains. She will then follow-up with Dr. Christi Connor on 12-15. Pression and activity guidelines were reviewed with the patient. She will contact the office with any questions or concerns. Questions were answered. Patient understands.      MARY Gaytan  11/22/2023  11:44 AM

## 2023-11-27 ENCOUNTER — OFFICE VISIT (OUTPATIENT)
Dept: SURGERY | Facility: CLINIC | Age: 53
End: 2023-11-27
Payer: COMMERCIAL

## 2023-11-27 DIAGNOSIS — Z90.13 ABSENCE OF BREAST, BILATERAL: Primary | ICD-10-CM

## 2023-11-27 PROCEDURE — 99024 POSTOP FOLLOW-UP VISIT: CPT

## 2023-11-27 NOTE — PROGRESS NOTES
Maciel Rivera is a 48year old female who presents today for a follow-up after  bilateral nipple-sparing mastectomies (Dr. Sergey Oates) and intraoperative inspection of mastectomy skin flap perfusion with indocyanine green laser imaging, bilateral immediate breast reconstruction with tissue expander (Bilateral  Natrelle 133S tissue expander, 500 mL with no intraoperative air) and acellular dermal matrix with Dr. Diana Bethea on 11/16/2023. She denies fever and chills. She denies nausea, vomiting, diarrhea or constipation. Her pain is controlled. Physical Exam     Breast: Bilateral breast incisions are clean, dry, intact. There is no evidence of hematoma or seroma bilaterally. The bilateral mastectomy skin is without erythema. Bilateral nipples are viable. The left breast has resolving superficial skin breakdown consistent with adhesive allergy. No evidence of open wound or necrosis. Bilateral breast drain sites are clean, dry, intact. Serous sanguinous fluid present. There were no vitals filed for this visit. Assessment and Plan     Carla Christine is doing well s/p  bilateral nipple-sparing mastectomies (Dr. Sergey Oates) and intraoperative inspection of mastectomy skin flap perfusion with indocyanine green laser imaging, bilateral immediate breast reconstruction with tissue expander (Bilateral  Natrelle 133S tissue expander, 500 mL with no intraoperative air) and acellular dermal matrix with Dr. Diana Bethea on 11/16/2023. Patient is here for wound check and drain removal.  The remaining bilateral breast drain sites were removed today due to low volume output. The patient tolerated this well. A new dressing of Neosporin, 2 x 2, Tegaderm was placed. The patient will now stop her oral antibiotics. The patient may now shower. The right breast incision was redressed with new Steri-Strips.   The left breast was redressed with Neosporin, Xeroform, Telfa, paper tape due to patient's resolving skin breakdown. The patient will change this dressing daily. Compression and activity guidelines were reviewed with the patient. She will follow-up with me on 12-6 for possible first fill. She was encouraged to contact the office with any questions or concerns. Questions were answered. Patient understands.      MARY Murillo  11/27/2023  10:32 AM

## 2023-12-06 ENCOUNTER — OFFICE VISIT (OUTPATIENT)
Dept: SURGERY | Facility: CLINIC | Age: 53
End: 2023-12-06
Payer: COMMERCIAL

## 2023-12-06 DIAGNOSIS — Z90.13 ABSENCE OF BREAST, BILATERAL: Primary | ICD-10-CM

## 2023-12-06 PROCEDURE — 99024 POSTOP FOLLOW-UP VISIT: CPT

## 2023-12-06 NOTE — PROGRESS NOTES
Chandni Sterling is a 48year old female who presents today for a follow-up after bilateral nipple-sparing mastectomies (Dr. Ely Crowley) and intraoperative inspection of mastectomy skin flap perfusion with indocyanine green laser imaging, bilateral immediate breast reconstruction with tissue expander (Bilateral  Natrelle 133S tissue expander, 500 mL with no intraoperative air) and acellular dermal matrix with Dr. John Paul Jordan on 11/16/2023. She denies fever and chills. She denies nausea, vomiting, diarrhea or constipation. Her pain is controlled. Physical Exam     Breast: Bilateral breast incisions are clean, dry, intact. There is no evidence of hematoma or seroma bilaterally. The bilateral mastectomy skin is without erythema. Bilateral nipples are viable. The left breast has resolving ecchymosis. There were no vitals filed for this visit. Assessment and Plan     Lissa Christine is doing well s/p bilateral nipple-sparing mastectomies (Dr. Ely Crowley) and intraoperative inspection of mastectomy skin flap perfusion with indocyanine green laser imaging, bilateral immediate breast reconstruction with tissue expander (Bilateral  Natrelle 133S tissue expander, 500 mL with no intraoperative air) and acellular dermal matrix with Dr. John Paul Jordan on 11/16/2023. As her incisions remain well-healed we will start the expansion process today. The bilateral breast tissue expander ports were identified using the magnet, Betadine solution was used to cleanse the sites. A sterile butterfly needle was introduced into the bilateral tissue expander ports and 60 mL of saline was introduced. 1 mL of seroma was aspirated on the right, 60 mL of dark serous fluid was aspirated on the left. The patient tolerated this well. Neosporin and a Band-Aid was placed. She is now at a bilateral total of 60 mL of saline. Compression and activity guidelines were reviewed with the patient.   She may leave her incisions open to air. She is following up with Dr. Brianna Denis on 12-15 for wound check and continued expansion. She interested in implant-based reconstruction and does not require any additional therapy such as chemo or radiation. She was encouraged to contact the office with any questions or concerns. Questions were answered. Patient understands.      MARY Coronado  12/6/2023  9:10 AM

## 2023-12-08 ENCOUNTER — TELEPHONE (OUTPATIENT)
Dept: SURGERY | Facility: CLINIC | Age: 53
End: 2023-12-08

## 2023-12-08 NOTE — TELEPHONE ENCOUNTER
Called pt back per [employer] message, pt was wondering if I had any information about Dr. Manuela Byrd billing for her previous surgery. She stated that Dr. Adamaris Lyle portion and the hospital portion had been handled but was still waiting on Dr. Manuela Byrd part. I let her know that I didn't see any new information and that it is still pending to insurance. I let her know if she did receive anything and had any other questions to give me a call or send me a [employer] message. Pt had no other questions for me at this time and was appreciative of the call back.

## 2023-12-15 ENCOUNTER — OFFICE VISIT (OUTPATIENT)
Dept: SURGERY | Facility: CLINIC | Age: 53
End: 2023-12-15
Payer: COMMERCIAL

## 2023-12-15 DIAGNOSIS — N60.99 ATYPICAL HYPERPLASIA OF BREAST: Primary | ICD-10-CM

## 2023-12-15 PROCEDURE — 99024 POSTOP FOLLOW-UP VISIT: CPT | Performed by: SURGERY

## 2023-12-15 NOTE — PROGRESS NOTES
Sarah Jaramillo is a 48year old female who presents today for a follow-up. She currently has a 13 cm base diameter tissue expander filled to 60 cc. Physical Examination:  Breasts:Bilateral breast incisions are clean dry and intact. There is no erythema or seroma noted. Acceptable shape and symmetry is noted. Assessment and Plan:    As her incisions remain well-healed we will continue with the expansion process today. The bilateral breast tissue expander ports were identified using the magnet, the sites were cleansed with Betadine solution. A sterile butterfly needle was then introduced into the bilateral tissue expander ports and 120 mL of saline was introduced. 35 mL of seroma was aspirated on the right side, 50 mL of seroma was aspirated on the left. The patient tolerated this well. Neosporin, 2 x 2, paper tape was placed to patient's sensitivity to Band-Aids. This brings the patient to a bilateral total of 180 mL of saline. Will follow-up in 1 week for continued expansion. We will start looking for a second stage hold date. She is interested in implant-based reconstruction. She was encouraged to contact the office with any questions or concerns.

## 2023-12-15 NOTE — PATIENT INSTRUCTIONS
Surgeon:         Dr. Lila Ortega                                        Tel:         [phone]                                  Fax:        [phone]    Surgery/Procedure: THIS IS NOT A PRE-OP  Second stage reconstruction with removal of bilateral breast tissue expanders, placement of permanent implants and autologous fat grafting to the bilateral breasts. 2.5 hours, general anesthesia, outpatient. Dx Code: R36.85    Hospital:  [facility]: [place] [address] [address], [address] [facility]           [phone]  [facility]: [address] [facility], [facility]               [phone]    1. Someone will need to drive you to and from the hospital if your procedure is outpatient. 2.Do not drink alcohol or smoke 24 hours prior to your procedure. 3. Bring a picture ID and your insurance card. 4. You will be contacted by the hospital the day before to confirm the procedure time and location. 5. Do not take any herbal supplements or blood thinners at least one week before your procedure/surgery. This includes NSAID's (aspirin, baby aspirin, Motrin, Ibuprofen, Aleve, Advil, Naproxen, etc), Plavix, fish oil, vitamin E, turmeric, CoQ10, or green tea supplements, etc. *TYLENOL or acetaminophen is ok to take*    6. PRE-OPERATIVE TESTING: History and physical with medical clearance is REQUIRED within 30 days of the surgery date and is mandatory per Dr. Bernardo Munson. *If this is not done, your surgery will be postponed*  MEDICAL CLEARANCE WITH  ____  CBC  CMP  EKG (within 90 days)    7. If you take Coumadin, Plavix, Xarelto, or Eliquis, please contact your prescribing physician for special instructions on how long to hold. If you take insulin, contact your primary care physician for special instructions. 8. Please inform us if you start or change any medications at least one week before surgery (ex: blood thinners, weight loss medications, diabetic medications, herbal supplements, etc)    9. Does patient have diagnosis of sleep apnea?     [   ] Yes     [   ]  No    Consent obtained  Photos taken on _________

## 2023-12-18 ENCOUNTER — TELEPHONE (OUTPATIENT)
Dept: SURGERY | Facility: CLINIC | Age: 53
End: 2023-12-18

## 2023-12-18 DIAGNOSIS — Z90.13 ABSENCE OF BREAST, BILATERAL: Primary | ICD-10-CM

## 2023-12-22 ENCOUNTER — NURSE ONLY (OUTPATIENT)
Dept: SURGERY | Facility: CLINIC | Age: 53
End: 2023-12-22
Payer: COMMERCIAL

## 2023-12-22 PROCEDURE — 99024 POSTOP FOLLOW-UP VISIT: CPT | Performed by: SURGERY

## 2023-12-22 NOTE — PROGRESS NOTES
Nikole Mtz is a 48year old female who presents today for a follow-up after bilateral nipple-sparing mastectomies (Dr. Ramin Dewey) and intraoperative inspection of mastectomy skin flap perfusion with indocyanine green laser imaging, bilateral immediate breast reconstruction with tissue expander (Bilateral  Natrelle 133S tissue expander, 500 mL with no intraoperative air) and acellular dermal matrix with Dr. Letty Ocampo on 11/16/2023. She denies fever and chills. She denies nausea, vomiting, diarrhea or constipation. Her pain is controlled. She is at a current bilateral total of 180mL of saline. Physical Exam      Breast: Bilateral breast incisions are clean, dry, intact. There is no evidence of hematoma or seroma bilaterally. The bilateral mastectomy skin is without erythema. Bilateral nipples are viable. There were no vitals filed for this visit. Assessment and Plan      Zaida Christine is doing well s/p bilateral nipple-sparing mastectomies (Dr. Ramin Dewey) and intraoperative inspection of mastectomy skin flap perfusion with indocyanine green laser imaging, bilateral immediate breast reconstruction with tissue expander (Bilateral  Natrelle 133S tissue expander, 500 mL with no intraoperative air) and acellular dermal matrix with Dr. Letty Ocampo on 11/16/2023. As her incisions remain well-healed we will start the expansion process today. The bilateral breast tissue expander ports were identified using the magnet, Betadine solution was used to cleanse the sites. A sterile butterfly needle was introduced into the bilateral tissue expander ports and 60 mL of saline was introduced. No seroma was aspirated on the right, 5mL of seroma was aspirated on the left. The patient tolerated this well. Neosporin and a Band-Aid was placed. She is now at a bilateral total of 240 mL of saline. Compression and activity guidelines were reviewed with the patient.  She will follow up in 1-2 weeks for continued expansion. She does not  require any additional therapy such as chemo or radiation. She was encouraged to contact the office with any questions or concerns. Questions were answered. Patient understands.       Carmen Rojas RN  12/22/23  9:02am

## 2023-12-29 ENCOUNTER — OFFICE VISIT (OUTPATIENT)
Dept: SURGERY | Facility: CLINIC | Age: 53
End: 2023-12-29
Payer: COMMERCIAL

## 2023-12-29 DIAGNOSIS — Z90.13 ABSENCE OF BREAST, BILATERAL: Primary | ICD-10-CM

## 2023-12-29 PROCEDURE — 99024 POSTOP FOLLOW-UP VISIT: CPT

## 2023-12-29 NOTE — PROGRESS NOTES
Kadie Meraz is a 48year old female who presents today for a follow-up after  bilateral nipple-sparing mastectomies (Dr. Fili Benites) and intraoperative inspection of mastectomy skin flap perfusion with indocyanine green laser imaging, bilateral immediate breast reconstruction with tissue expander (Bilateral  Natrelle 133S tissue expander, 500 mL with no intraoperative air) and acellular dermal matrix with Dr. Brianna Denis on 11/16/2023. She is now at a bilateral total of 240 mL of saline. She denies fever and chills. She denies nausea, vomiting, diarrhea or constipation. Her pain is controlled. Physical Exam     Breast: Bilateral breast incisions are clean, dry, intact. There is no evidence of hematoma or seroma bilaterally. The bilateral mastectomy skin is without erythema. Bilateral nipples are viable. There were no vitals filed for this visit. Assessment and Plan     [facility]anc[facility] is doing well s/p bilateral nipple-sparing mastectomies (Dr. Fili Benites) and intraoperative inspection of mastectomy skin flap perfusion with indocyanine green laser imaging, bilateral immediate breast reconstruction with tissue expander (Bilateral  Natrelle 133S tissue expander, 500 mL with no intraoperative air) and acellular dermal matrix with Dr. Brianna Denis on 11/16/2023. As her incisions remain well-healed we will start the expansion process today. The bilateral breast tissue expander ports were identified using the magnet, Betadine solution was used to cleanse the sites. A sterile butterfly needle was introduced into the bilateral tissue expander ports and 60 mL of saline was introduced. No seroma was aspirated bilaterally. The patient tolerated this well. Neosporin, 2 x 2, paper tape was placed. She is now at a bilateral total of 300 mL of saline. Compression activity guidelines reviewed with the patient. She will follow-up in 1 week for continued expansion.     She does not  require any additional therapy such as chemo or radiation. She was encouraged to contact the office with any questions or concerns. She has a preoperative visit made with Dr. Rene Sutton on 3- for her second stage implant based reconstruction. She is also on the wait list.    Questions were answered. Patient understands.      Royal Davis, MARY  12/29/2023  9:50 AM

## 2024-01-05 ENCOUNTER — OFFICE VISIT (OUTPATIENT)
Dept: SURGERY | Facility: CLINIC | Age: 54
End: 2024-01-05
Payer: COMMERCIAL

## 2024-01-05 DIAGNOSIS — Z90.13 ABSENCE OF BREAST, BILATERAL: Primary | ICD-10-CM

## 2024-01-05 PROCEDURE — 99024 POSTOP FOLLOW-UP VISIT: CPT

## 2024-01-05 NOTE — PROGRESS NOTES
Mere Christine is a 53 year old female who presents today for a follow-up after bilateral nipple-sparing mastectomies (Dr. Franz) and intraoperative inspection of mastectomy skin flap perfusion with indocyanine green laser imaging, bilateral immediate breast reconstruction with tissue expander (Bilateral  Natrelle 133S tissue expander, 500 mL with no intraoperative air) and acellular dermal matrix with Dr. Spencer on 11/16/2023.      She is now at a bilateral total of 300 mL of saline.     She denies fever and chills. She denies nausea, vomiting, diarrhea or constipation.   Her pain is controlled.      Physical Exam     Breast: Bilateral breast incisions are clean, dry, intact.  There is no evidence of hematoma or seroma bilaterally.  The bilateral mastectomy skin is without erythema.  Bilateral nipples are viable.      There were no vitals filed for this visit.      Assessment and Plan     Mere Christine is doing well s/p bilateral nipple-sparing mastectomies (Dr. Franz) and intraoperative inspection of mastectomy skin flap perfusion with indocyanine green laser imaging, bilateral immediate breast reconstruction with tissue expander (Bilateral  Natrelle 133S tissue expander, 500 mL with no intraoperative air) and acellular dermal matrix with Dr. Spencer on 11/16/2023.       As her incisions remain well-healed we will start the expansion process today.  The bilateral breast tissue expander ports were identified using the magnet, Betadine solution was used to cleanse the sites.  A sterile butterfly needle was introduced into the bilateral tissue expander ports and 60 mL of saline was introduced.  No seroma was aspirated bilaterally.  The patient tolerated this well.  Neosporin, 2 x 2, paper tape was placed.  She is now at a bilateral total of 360 mL of saline.    Compression activity guidelines reviewed with the patient.  She will follow-up in 1 week for continued expansion.     She does not   require any additional therapy such as chemo or radiation.  She was encouraged to contact the office with any questions or concerns.      She has a preoperative visit made with Dr. Spencer on 3- for her second stage implant based reconstruction.  She is also on the wait list.    Questions were answered. Patient understands.     Gwendolyn May, MARY  1/5/2024  10:50 AM

## 2024-01-12 ENCOUNTER — NURSE ONLY (OUTPATIENT)
Dept: SURGERY | Facility: CLINIC | Age: 54
End: 2024-01-12
Payer: COMMERCIAL

## 2024-01-12 PROCEDURE — 99024 POSTOP FOLLOW-UP VISIT: CPT | Performed by: SURGERY

## 2024-01-12 NOTE — PROGRESS NOTES
Mere Christine is a 53 year old female who presents today for a follow-up after bilateral nipple-sparing mastectomies (Dr. Franz) and intraoperative inspection of mastectomy skin flap perfusion with indocyanine green laser imaging, bilateral immediate breast reconstruction with tissue expander (Bilateral  Natrelle 133S tissue expander, 500 mL with no intraoperative air) and acellular dermal matrix with Dr. Spencer on 11/16/2023.        She is now at a bilateral total of 360 mL of saline.      She denies fever and chills. She denies nausea, vomiting, diarrhea or constipation. Her pain is controlled.          Physical Exam      Breast: Bilateral breast incisions are clean, dry, intact.  There is no evidence of hematoma or seroma bilaterally.  The bilateral mastectomy skin is without erythema.  Bilateral nipples are viable.       There were no vitals filed for this visit.        Assessment and Plan      Mere Christine is doing well s/p bilateral nipple-sparing mastectomies (Dr. Franz) and intraoperative inspection of mastectomy skin flap perfusion with indocyanine green laser imaging, bilateral immediate breast reconstruction with tissue expander (Bilateral  Natrelle 133S tissue expander, 500 mL with no intraoperative air) and acellular dermal matrix with Dr. Spencer on 11/16/2023.        As her incisions remain well-healed we will start the expansion process today.  The bilateral breast tissue expander ports were identified using the magnet, Betadine solution was used to cleanse the sites.  A sterile butterfly needle was introduced into the bilateral tissue expander ports and 60 mL of saline was introduced.  No seroma was aspirated bilaterally.  The patient tolerated this well.  Neosporin, 2 x 2, paper tape was placed.  She is now at a bilateral total of 420 mL of saline.     Compression activity guidelines reviewed with the patient.  She will follow-up in 1 week for continued expansion (most likely  only a half fill of 30mL as she happy with the size but would like some additional upper fullness)     She does not  require any additional therapy such as chemo or radiation.  She was encouraged to contact the office with any questions or concerns.      She has a preoperative visit made with Dr. Spencer on 3- for her second stage implant based reconstruction.  She is also on the wait list.     Questions were answered. Patient understands.    Digna Saucedo RN  1/12/24

## 2024-01-19 ENCOUNTER — NURSE ONLY (OUTPATIENT)
Dept: SURGERY | Facility: CLINIC | Age: 54
End: 2024-01-19
Payer: COMMERCIAL

## 2024-01-19 PROCEDURE — 99024 POSTOP FOLLOW-UP VISIT: CPT | Performed by: SURGERY

## 2024-01-19 NOTE — PROGRESS NOTES
Mere Christine is a 53 year old female who presents today for a follow-up after bilateral nipple-sparing mastectomies (Dr. Franz) and intraoperative inspection of mastectomy skin flap perfusion with indocyanine green laser imaging, bilateral immediate breast reconstruction with tissue expander (Bilateral  Natrelle 133S tissue expander, 500 mL with no intraoperative air) and acellular dermal matrix with Dr. Spencer on 11/16/2023.         She is now at a bilateral total of 420 mL of saline.      She denies fever and chills. She denies nausea, vomiting, diarrhea or constipation. Her pain is controlled.          Physical Exam      Breast: Bilateral breast incisions are clean, dry, intact.  There is no evidence of hematoma or seroma bilaterally.  The bilateral mastectomy skin is without erythema.  Bilateral nipples are viable.       There were no vitals filed for this visit.        Assessment and Plan      Mere Christine is doing well s/p bilateral nipple-sparing mastectomies (Dr. Franz) and intraoperative inspection of mastectomy skin flap perfusion with indocyanine green laser imaging, bilateral immediate breast reconstruction with tissue expander (Bilateral  Natrelle 133S tissue expander, 500 mL with no intraoperative air) and acellular dermal matrix with Dr. Spencer on 11/16/2023.        As her incisions remain well-healed we will start the expansion process today.  The bilateral breast tissue expander ports were identified using the magnet, Betadine solution was used to cleanse the sites.  A sterile butterfly needle was introduced into the bilateral tissue expander ports and 30 mL of saline was introduced per patients request to attempt to achieve upper breast fullness..  No seroma was aspirated bilaterally.  The patient tolerated this well.  Neosporin, 2 x 2, paper tape was placed.  She is now at a bilateral total of 450 mL of saline.     Compression activity guidelines reviewed with the patient.   She does not  require any additional therapy such as chemo or radiation.  She was encouraged to contact the office with any questions or concerns.      She has a preoperative visit made with Dr. Spencer on 3- for her second stage implant based reconstruction which is scheduled on 7/24/24.  She is also on the wait list.     Questions were answered. Patient understands.    RHONDA Sawyer  1/19/24  9:34am

## 2024-02-20 ENCOUNTER — OFFICE VISIT (OUTPATIENT)
Dept: SURGERY | Facility: CLINIC | Age: 54
End: 2024-02-20
Payer: COMMERCIAL

## 2024-02-20 DIAGNOSIS — Z90.13 ABSENCE OF BREAST, BILATERAL: Primary | ICD-10-CM

## 2024-02-20 PROCEDURE — 99213 OFFICE O/P EST LOW 20 MIN: CPT | Performed by: SURGERY

## 2024-02-20 NOTE — PATIENT INSTRUCTIONS
Surgeon:         Dr. Larry Spencer                                        Tel:         [phone]                                  Fax:        [phone]    Surgery/Procedure: Second stage reconstruction with removal of bilateral breast tissue expanders, placement of permanent implants and autologous fat grafting to the bilateral breasts. 2.5 hours, general anesthesia, outpatient.     Dx Code: Z90.13    Hospital:  [facility]: [address] [place] [id number]           [phone]  7/24/2024    1. Someone will need to drive you to and from the hospital if your procedure is outpatient.    2.Do not drink alcohol or smoke 24 hours prior to your procedure.    3. Bring a picture ID and your insurance card.    4. You will be contacted by the hospital the day before to confirm the procedure time and location.     5. Do not take any herbal supplements or blood thinners at least one week before your procedure/surgery. This includes NSAID's (aspirin, baby aspirin, Motrin, Ibuprofen, Aleve, Advil, Naproxen, etc), Plavix, fish oil, vitamin E, turmeric, CoQ10, or green tea supplements, etc. *TYLENOL or acetaminophen is ok to take*    6. PRE-OPERATIVE TESTING: History and physical with medical clearance is REQUIRED within 30 days of the surgery date and is mandatory per Dr. Spencer. *If this is not done, your surgery will be postponed*  MEDICAL CLEARANCE WITH DR. Zack SANTIAGO  CMP  EKG (within 90 days)    7. If you take Coumadin, Plavix, Xarelto, or Eliquis, please contact your prescribing physician for special instructions on how long to hold. If you take insulin, contact your primary care physician for special instructions.     8. Please inform us if you start or change any medications at least one week before surgery (ex: blood thinners, weight loss medications, diabetic medications, herbal supplements, etc)    9. Does patient have diagnosis of sleep apnea?    [   ] Yes     [  X ]  No    Consent obtained  Photos  taken on 2/20/2024

## 2024-02-20 NOTE — PROGRESS NOTES
Mere Christine is a 53 year old female who presents today for a follow-up. She currently has a 13 cm base diameter tissue expander filled to 450 cc.    Physical Examination:  Breasts:Bilateral breast incisions are clean dry and intact.  There is no erythema or seroma noted.  Acceptable shape and symmetry is noted.  Abdomen: Moderate central abdominal and flank lipodystrophy is noted.  A reducible 1 cm umbilical hernia is noted.      Assessment and Plan:  We discussed the plan for the second stage which will include removal of bilateral breast tissue expanders, placement of smooth, round, silicone implants, and fat grafting to bilateral reconstructed breasts.  The nature of the procedure was reviewed with the patient.  We discussed the risks of surgery including but not limited to bleeding, infection, scarring, delayed wound healing, asymmetry, implant malposition, implant infection or extrusion requiring removal, ALCL, capsular contracture, hypertrophic scarring or keloid, injury to intra-abdominal structures, contour abnormalities, cysts or calcifications requiring biopsy, and need for further surgery.  We reviewed the expected postoperative course including possible need for drains, as well as need for activity limitation and compression.  Multiple questions were answered the patient's satisfaction.  No guarantees as to outcome were offered.  The patient expresses understanding and wishes to proceed.  A total of 20 minutes was spent reviewing the patient's history and diagnostic testing, examine the patient, reviewing the reconstructive plan, and coordinating the patient's care.

## 2024-02-21 DIAGNOSIS — Z90.13 ABSENCE OF BREAST, BILATERAL: Primary | ICD-10-CM

## 2024-03-27 ENCOUNTER — OFFICE VISIT (OUTPATIENT)
Dept: SURGERY | Facility: CLINIC | Age: 54
End: 2024-03-27
Payer: COMMERCIAL

## 2024-03-27 DIAGNOSIS — Z90.13 ABSENCE OF BREAST, BILATERAL: Primary | ICD-10-CM

## 2024-03-27 PROCEDURE — 99024 POSTOP FOLLOW-UP VISIT: CPT

## 2024-03-27 NOTE — PROGRESS NOTES
Mere Christine is a 53 year old female who presents today for a follow-up after bilateral nipple-sparing mastectomies (Dr. Franz) and intraoperative inspection of mastectomy skin flap perfusion with indocyanine green laser imaging, bilateral immediate breast reconstruction with tissue expander (Bilateral  Natrelle 133S tissue expander, 500 mL with no intraoperative air) and acellular dermal matrix with Dr. Spencer on 11/16/2023.       She is now at a bilateral total of 450 mL of saline.     She denies fever and chills. She denies nausea, vomiting, diarrhea or constipation.   Her pain is controlled.      Physical Exam     Breasts: Bilateral breast incisions are clean, dry, intact.  There is no evidence of hematoma or seroma bilaterally.  The bilateral mastectomy skin is without erythema.  Bilateral nipples remain viable.    There were no vitals filed for this visit.      Assessment and Plan     Mere Christine is doing well s/p bilateral nipple-sparing mastectomies (Dr. Franz) and intraoperative inspection of mastectomy skin flap perfusion with indocyanine green laser imaging, bilateral immediate breast reconstruction with tissue expander (Bilateral  Natrelle 133S tissue expander, 500 mL with no intraoperative air) and acellular dermal matrix with Dr. Spencer on 11/16/2023.         Patient reported that she would like an additional fill.  The bilateral breast tissue expander ports were identified using the magnet, these areas were cleansed with Betadine solution.  A sterile butterfly needle was introduced into the bilateral tissue expander ports and 25 mL of saline was added.  0 mL of seroma was aspirated bilaterally.  The patient tolerated this well.  Neosporin, 2 x 2, paper tape was placed.  She is now at a bilateral total of 475 mL of saline.    Compression and activity guidelines were reviewed with the patient.  She has a second stage implant based surgery date of 7-24.  She was encouraged to  contact the office with any questions or concerns or if she would like an additional fill.    Questions were answered. Patient understands.     Gwendolyn May, MARY  3/27/2024  12:44 PM

## 2024-06-12 ENCOUNTER — APPOINTMENT (OUTPATIENT)
Dept: CARDIOLOGY | Age: 54
End: 2024-06-12

## 2024-06-14 ENCOUNTER — APPOINTMENT (OUTPATIENT)
Dept: CARDIOLOGY | Age: 54
End: 2024-06-14

## 2024-07-02 ENCOUNTER — APPOINTMENT (OUTPATIENT)
Dept: CARDIOLOGY | Age: 54
End: 2024-07-02

## 2024-07-02 VITALS
HEART RATE: 60 BPM | DIASTOLIC BLOOD PRESSURE: 77 MMHG | SYSTOLIC BLOOD PRESSURE: 121 MMHG | HEIGHT: 67 IN | WEIGHT: 139 LBS | BODY MASS INDEX: 21.82 KG/M2

## 2024-07-02 DIAGNOSIS — R07.2 PRECORDIAL PAIN: ICD-10-CM

## 2024-07-02 DIAGNOSIS — Z82.49 FAMILY HISTORY OF PREMATURE CAD: ICD-10-CM

## 2024-07-02 DIAGNOSIS — R93.1 ELEVATED CORONARY ARTERY CALCIUM SCORE: Primary | ICD-10-CM

## 2024-07-02 RX ORDER — CETIRIZINE HYDROCHLORIDE 10 MG/1
1 TABLET ORAL DAILY
COMMUNITY

## 2024-07-02 SDOH — HEALTH STABILITY: PHYSICAL HEALTH: ON AVERAGE, HOW MANY MINUTES DO YOU ENGAGE IN EXERCISE AT THIS LEVEL?: 60 MIN

## 2024-07-02 SDOH — HEALTH STABILITY: PHYSICAL HEALTH: ON AVERAGE, HOW MANY DAYS PER WEEK DO YOU ENGAGE IN MODERATE TO STRENUOUS EXERCISE (LIKE A BRISK WALK)?: 3 DAYS

## 2024-07-02 ASSESSMENT — ENCOUNTER SYMPTOMS
WEIGHT GAIN: 0
HEMOPTYSIS: 0
WEIGHT LOSS: 0
COUGH: 0
BRUISES/BLEEDS EASILY: 0
SUSPICIOUS LESIONS: 0
FEVER: 0
HEMATOCHEZIA: 0
ALLERGIC/IMMUNOLOGIC COMMENTS: NO NEW FOOD ALLERGIES
CHILLS: 0

## 2024-07-02 ASSESSMENT — PATIENT HEALTH QUESTIONNAIRE - PHQ9
SUM OF ALL RESPONSES TO PHQ9 QUESTIONS 1 AND 2: 0
1. LITTLE INTEREST OR PLEASURE IN DOING THINGS: NOT AT ALL
SUM OF ALL RESPONSES TO PHQ9 QUESTIONS 1 AND 2: 0
2. FEELING DOWN, DEPRESSED OR HOPELESS: NOT AT ALL
CLINICAL INTERPRETATION OF PHQ2 SCORE: NO FURTHER SCREENING NEEDED

## 2024-07-03 ENCOUNTER — APPOINTMENT (OUTPATIENT)
Dept: ADMINISTRATIVE | Facility: HOSPITAL | Age: 54
End: 2024-07-03
Payer: COMMERCIAL

## 2024-07-08 ENCOUNTER — TELEPHONE (OUTPATIENT)
Dept: SURGERY | Facility: CLINIC | Age: 54
End: 2024-07-08

## 2024-07-08 NOTE — TELEPHONE ENCOUNTER
Called patient and left voicemail reminding patient to schedule an appointment with her PCP for preoperative clearance prior to surgery.

## 2024-07-10 ENCOUNTER — TELEPHONE (OUTPATIENT)
Dept: SURGERY | Facility: CLINIC | Age: 54
End: 2024-07-10

## 2024-07-10 NOTE — TELEPHONE ENCOUNTER
Contacted Dr. Dixon's office as medical clearance letter and EKG were faxed but labs missing. Spoke with RHONDA Chakraborty, who stated she will obtain results and fax to our office.

## 2024-07-22 ENCOUNTER — E-ADVICE (OUTPATIENT)
Dept: CARDIOLOGY | Age: 54
End: 2024-07-22

## 2024-07-22 DIAGNOSIS — R93.1 ELEVATED CORONARY ARTERY CALCIUM SCORE: Primary | ICD-10-CM

## 2024-07-22 DIAGNOSIS — R07.2 PRECORDIAL PAIN: ICD-10-CM

## 2024-07-22 DIAGNOSIS — Z82.49 FAMILY HISTORY OF PREMATURE CAD: ICD-10-CM

## 2024-07-22 DIAGNOSIS — I25.10 ATHEROSCLEROSIS OF NATIVE CORONARY ARTERY OF NATIVE HEART, UNSPECIFIED WHETHER ANGINA PRESENT: ICD-10-CM

## 2024-07-24 ENCOUNTER — HOSPITAL ENCOUNTER (OUTPATIENT)
Facility: HOSPITAL | Age: 54
Setting detail: HOSPITAL OUTPATIENT SURGERY
Discharge: HOME | End: 2024-07-24
Attending: SURGERY | Admitting: SURGERY
Payer: COMMERCIAL

## 2024-07-24 ENCOUNTER — ANESTHESIA EVENT (OUTPATIENT)
Dept: SURGERY | Facility: HOSPITAL | Age: 54
End: 2024-07-24
Payer: COMMERCIAL

## 2024-07-24 ENCOUNTER — ANESTHESIA (OUTPATIENT)
Dept: SURGERY | Facility: HOSPITAL | Age: 54
End: 2024-07-24
Payer: COMMERCIAL

## 2024-07-24 VITALS
OXYGEN SATURATION: 98 % | WEIGHT: 137 LBS | HEIGHT: 67 IN | DIASTOLIC BLOOD PRESSURE: 77 MMHG | TEMPERATURE: 98 F | RESPIRATION RATE: 18 BRPM | BODY MASS INDEX: 21.5 KG/M2 | HEART RATE: 66 BPM | SYSTOLIC BLOOD PRESSURE: 122 MMHG

## 2024-07-24 DIAGNOSIS — Z90.13 ABSENCE OF BREAST, BILATERAL: ICD-10-CM

## 2024-07-24 LAB — B-HCG UR QL: NEGATIVE

## 2024-07-24 PROCEDURE — 0HPT0NZ REMOVAL OF TISSUE EXPANDER FROM RIGHT BREAST, OPEN APPROACH: ICD-10-PCS | Performed by: SURGERY

## 2024-07-24 PROCEDURE — 0HRV0JZ REPLACEMENT OF BILATERAL BREAST WITH SYNTHETIC SUBSTITUTE, OPEN APPROACH: ICD-10-PCS | Performed by: SURGERY

## 2024-07-24 PROCEDURE — 0HUV37Z SUPPLEMENT BILATERAL BREAST WITH AUTOLOGOUS TISSUE SUBSTITUTE, PERCUTANEOUS APPROACH: ICD-10-PCS | Performed by: SURGERY

## 2024-07-24 PROCEDURE — 0HPU0NZ REMOVAL OF TISSUE EXPANDER FROM LEFT BREAST, OPEN APPROACH: ICD-10-PCS | Performed by: SURGERY

## 2024-07-24 PROCEDURE — 81025 URINE PREGNANCY TEST: CPT

## 2024-07-24 PROCEDURE — 88305 TISSUE EXAM BY PATHOLOGIST: CPT | Performed by: SURGERY

## 2024-07-24 DEVICE — NATRELLE INSPIRA SCF 485CC FULL PROFILE  SMOOTH ROUND SILICONE
Type: IMPLANTABLE DEVICE | Site: BREAST | Status: FUNCTIONAL
Brand: NATRELLE INSPIRA COHESIVE BREAST IMPLANTS

## 2024-07-24 RX ORDER — HYDROCODONE BITARTRATE AND ACETAMINOPHEN 5; 325 MG/1; MG/1
1 TABLET ORAL ONCE AS NEEDED
Status: DISCONTINUED | OUTPATIENT
Start: 2024-07-24 | End: 2024-07-24

## 2024-07-24 RX ORDER — MIDAZOLAM HYDROCHLORIDE 1 MG/ML
INJECTION INTRAMUSCULAR; INTRAVENOUS AS NEEDED
Status: DISCONTINUED | OUTPATIENT
Start: 2024-07-24 | End: 2024-07-24 | Stop reason: SURG

## 2024-07-24 RX ORDER — MEPERIDINE HYDROCHLORIDE 25 MG/ML
12.5 INJECTION INTRAMUSCULAR; INTRAVENOUS; SUBCUTANEOUS AS NEEDED
Status: DISCONTINUED | OUTPATIENT
Start: 2024-07-24 | End: 2024-07-24

## 2024-07-24 RX ORDER — LIDOCAINE HYDROCHLORIDE 10 MG/ML
INJECTION, SOLUTION EPIDURAL; INFILTRATION; INTRACAUDAL; PERINEURAL AS NEEDED
Status: DISCONTINUED | OUTPATIENT
Start: 2024-07-24 | End: 2024-07-24 | Stop reason: SURG

## 2024-07-24 RX ORDER — HYDROCODONE BITARTRATE AND ACETAMINOPHEN 5; 325 MG/1; MG/1
2 TABLET ORAL ONCE AS NEEDED
Status: DISCONTINUED | OUTPATIENT
Start: 2024-07-24 | End: 2024-07-24

## 2024-07-24 RX ORDER — ONDANSETRON 2 MG/ML
4 INJECTION INTRAMUSCULAR; INTRAVENOUS EVERY 6 HOURS PRN
Status: DISCONTINUED | OUTPATIENT
Start: 2024-07-24 | End: 2024-07-24

## 2024-07-24 RX ORDER — LIDOCAINE HYDROCHLORIDE AND EPINEPHRINE 10; 10 MG/ML; UG/ML
INJECTION, SOLUTION INFILTRATION; PERINEURAL AS NEEDED
Status: DISCONTINUED | OUTPATIENT
Start: 2024-07-24 | End: 2024-07-24 | Stop reason: HOSPADM

## 2024-07-24 RX ORDER — SODIUM CHLORIDE, SODIUM LACTATE, POTASSIUM CHLORIDE, CALCIUM CHLORIDE 600; 310; 30; 20 MG/100ML; MG/100ML; MG/100ML; MG/100ML
INJECTION, SOLUTION INTRAVENOUS CONTINUOUS
Status: DISCONTINUED | OUTPATIENT
Start: 2024-07-24 | End: 2024-07-24

## 2024-07-24 RX ORDER — MIDAZOLAM HYDROCHLORIDE 1 MG/ML
1 INJECTION INTRAMUSCULAR; INTRAVENOUS EVERY 5 MIN PRN
Status: DISCONTINUED | OUTPATIENT
Start: 2024-07-24 | End: 2024-07-24

## 2024-07-24 RX ORDER — ONDANSETRON 2 MG/ML
INJECTION INTRAMUSCULAR; INTRAVENOUS AS NEEDED
Status: DISCONTINUED | OUTPATIENT
Start: 2024-07-24 | End: 2024-07-24 | Stop reason: SURG

## 2024-07-24 RX ORDER — HYDROMORPHONE HYDROCHLORIDE 1 MG/ML
0.4 INJECTION, SOLUTION INTRAMUSCULAR; INTRAVENOUS; SUBCUTANEOUS EVERY 5 MIN PRN
Status: DISCONTINUED | OUTPATIENT
Start: 2024-07-24 | End: 2024-07-24

## 2024-07-24 RX ORDER — PROCHLORPERAZINE EDISYLATE 5 MG/ML
5 INJECTION INTRAMUSCULAR; INTRAVENOUS EVERY 8 HOURS PRN
Status: DISCONTINUED | OUTPATIENT
Start: 2024-07-24 | End: 2024-07-24

## 2024-07-24 RX ORDER — ACETAMINOPHEN 500 MG
1000 TABLET ORAL ONCE
Status: DISCONTINUED | OUTPATIENT
Start: 2024-07-24 | End: 2024-07-24 | Stop reason: HOSPADM

## 2024-07-24 RX ORDER — DIPHENHYDRAMINE HYDROCHLORIDE 50 MG/ML
12.5 INJECTION INTRAMUSCULAR; INTRAVENOUS AS NEEDED
Status: DISCONTINUED | OUTPATIENT
Start: 2024-07-24 | End: 2024-07-24

## 2024-07-24 RX ORDER — DEXAMETHASONE SODIUM PHOSPHATE 4 MG/ML
VIAL (ML) INJECTION AS NEEDED
Status: DISCONTINUED | OUTPATIENT
Start: 2024-07-24 | End: 2024-07-24 | Stop reason: SURG

## 2024-07-24 RX ORDER — HYDROMORPHONE HYDROCHLORIDE 1 MG/ML
INJECTION, SOLUTION INTRAMUSCULAR; INTRAVENOUS; SUBCUTANEOUS
Status: COMPLETED
Start: 2024-07-24 | End: 2024-07-24

## 2024-07-24 RX ORDER — ROCURONIUM BROMIDE 10 MG/ML
INJECTION, SOLUTION INTRAVENOUS AS NEEDED
Status: DISCONTINUED | OUTPATIENT
Start: 2024-07-24 | End: 2024-07-24 | Stop reason: SURG

## 2024-07-24 RX ORDER — ACETAMINOPHEN 500 MG
1000 TABLET ORAL ONCE AS NEEDED
Status: DISCONTINUED | OUTPATIENT
Start: 2024-07-24 | End: 2024-07-24

## 2024-07-24 RX ORDER — PHENYLEPHRINE HCL 10 MG/ML
VIAL (ML) INJECTION AS NEEDED
Status: DISCONTINUED | OUTPATIENT
Start: 2024-07-24 | End: 2024-07-24 | Stop reason: SURG

## 2024-07-24 RX ORDER — HYDROMORPHONE HYDROCHLORIDE 1 MG/ML
0.2 INJECTION, SOLUTION INTRAMUSCULAR; INTRAVENOUS; SUBCUTANEOUS EVERY 5 MIN PRN
Status: DISCONTINUED | OUTPATIENT
Start: 2024-07-24 | End: 2024-07-24

## 2024-07-24 RX ORDER — NALOXONE HYDROCHLORIDE 0.4 MG/ML
0.08 INJECTION, SOLUTION INTRAMUSCULAR; INTRAVENOUS; SUBCUTANEOUS AS NEEDED
Status: DISCONTINUED | OUTPATIENT
Start: 2024-07-24 | End: 2024-07-24

## 2024-07-24 RX ORDER — EPHEDRINE SULFATE 50 MG/ML
INJECTION INTRAVENOUS AS NEEDED
Status: DISCONTINUED | OUTPATIENT
Start: 2024-07-24 | End: 2024-07-24 | Stop reason: SURG

## 2024-07-24 RX ORDER — HYDROMORPHONE HYDROCHLORIDE 1 MG/ML
0.6 INJECTION, SOLUTION INTRAMUSCULAR; INTRAVENOUS; SUBCUTANEOUS EVERY 5 MIN PRN
Status: DISCONTINUED | OUTPATIENT
Start: 2024-07-24 | End: 2024-07-24

## 2024-07-24 RX ADMIN — MIDAZOLAM HYDROCHLORIDE 2 MG: 1 INJECTION INTRAMUSCULAR; INTRAVENOUS at 13:50:00

## 2024-07-24 RX ADMIN — ROCURONIUM BROMIDE 10 MG: 10 INJECTION, SOLUTION INTRAVENOUS at 15:22:00

## 2024-07-24 RX ADMIN — ONDANSETRON 4 MG: 2 INJECTION INTRAMUSCULAR; INTRAVENOUS at 16:04:00

## 2024-07-24 RX ADMIN — ROCURONIUM BROMIDE 50 MG: 10 INJECTION, SOLUTION INTRAVENOUS at 14:00:00

## 2024-07-24 RX ADMIN — DEXAMETHASONE SODIUM PHOSPHATE 4 MG: 4 MG/ML VIAL (ML) INJECTION at 14:00:00

## 2024-07-24 RX ADMIN — EPHEDRINE SULFATE 5 MG: 50 INJECTION INTRAVENOUS at 14:30:00

## 2024-07-24 RX ADMIN — SODIUM CHLORIDE, SODIUM LACTATE, POTASSIUM CHLORIDE, CALCIUM CHLORIDE: 600; 310; 30; 20 INJECTION, SOLUTION INTRAVENOUS at 13:50:00

## 2024-07-24 RX ADMIN — PHENYLEPHRINE HCL 50 MCG: 10 MG/ML VIAL (ML) INJECTION at 15:37:00

## 2024-07-24 RX ADMIN — LIDOCAINE HYDROCHLORIDE 50 MG: 10 INJECTION, SOLUTION EPIDURAL; INFILTRATION; INTRACAUDAL; PERINEURAL at 13:59:00

## 2024-07-24 RX ADMIN — EPHEDRINE SULFATE 5 MG: 50 INJECTION INTRAVENOUS at 16:01:00

## 2024-07-24 RX ADMIN — EPHEDRINE SULFATE 5 MG: 50 INJECTION INTRAVENOUS at 15:13:00

## 2024-07-24 NOTE — ANESTHESIA PREPROCEDURE EVALUATION
PRE-OP EVALUATION    Patient Name: Mere Christine    Admit Diagnosis: Absence of breast, bilateral [Z90.13]    Pre-op Diagnosis: Absence of breast, bilateral [Z90.13]    Second stage reconstruction with removal of bilateral breast tissue expanders, placement of permanent implants and autologous fat grafting to the bilateral breasts.    Anesthesia Procedure: Second stage reconstruction with removal of bilateral breast tissue expanders, placement of permanent implants and autologous fat grafting to the bilateral breasts. (Bilateral)  . (Bilateral)    Surgeons and Role:     * Larry Spencer MD - Primary    Pre-op vitals reviewed.  Temp: 98.4 °F (36.9 °C)  Pulse: 63  Resp: 16  BP: 152/90  SpO2: 100 %  Body mass index is 21.46 kg/m².    Current medications reviewed.  Hospital Medications:  • [Transfer Hold] acetaminophen (Tylenol Extra Strength) tab 1,000 mg  1,000 mg Oral Once   • lactated ringers infusion   Intravenous Continuous   • ceFAZolin (Ancef) 2g in 10mL IV syringe premix  2 g Intravenous Once   • ceFAZolin (Ancef) 1 g, gentamicin (Garamycin) 80 mg in sodium chloride 0.9% 1,000 mL irrigation   Irrigation Once (Intra-Op)   • EPINEPHrine (Adrenalin) 1 mg, lidocaine (Xylocaine) 1 % 50 mL in lactated ringers 1,000 mL tumescent mixture/irrigation   Infiltration Once (Intra-Op)       Outpatient Medications:     Medications Prior to Admission   Medication Sig Dispense Refill Last Dose   • Lysine 500 MG Oral Tab Take by mouth.   Past Month   • atorvastatin 20 MG Oral Tab Take 1 tablet (20 mg total) by mouth daily.   Past Month   • levonorgestrel (BRII) 13.5 MG Intrauterine IUD 1 each by Intrauterine route one time.   7/24/2024   • FOLIC ACID OR Take by mouth.   Past Month   • Cholecalciferol (VITAMIN D) 50 MCG (2000 UT) Oral Cap Take by mouth.   Past Month   • cetirizine 10 MG Oral Tab Take 1 tablet (10 mg total) by mouth daily.   Past Week   • Albuterol Sulfate HFA (PROAIR HFA) 108 (90 Base) MCG/ACT  Inhalation Aero Soln Inhale 2 puffs into the lungs every 6 (six) hours as needed for Wheezing. 3 Inhaler 0 More than a month       Allergies: Seasonal and Adhesive tape      Anesthesia Evaluation    Patient summary reviewed.    Anesthetic Complications  (+) history of anesthetic complications  History of: PONV       GI/Hepatic/Renal    Negative GI/hepatic/renal ROS.                             Cardiovascular                                                       Endo/Other    Negative endo/other ROS.                              Pulmonary      (+) asthma                     Neuro/Psych    Negative neuro/psych ROS.                          TMJ dysfunction      Past Surgical History:   Procedure Laterality Date   • Cyst removal  2002    EH   • Implantable breast prosthesis     • Dereck biopsy stereo nodule 1 site left (cpt=19081)  2021    benign   • Dereck biopsy stereo nodule 1 site right (cpt=19081)  02/2017    [facility]   • Needle biopsy left  2018    benign   • Other surgical history      breast implants   • Removal of ovarian cyst(s) Left    •  breast biopsy 1 site left (cpt=19083) Left [id number]    benign     Social History     Socioeconomic History   • Marital status:    Tobacco Use   • Smoking status: Never   • Smokeless tobacco: Never   Vaping Use   • Vaping status: Never Used   Substance and Sexual Activity   • Alcohol use: Yes     Comment: social   • Drug use: No   • Sexual activity: Yes     Partners: Male     Birth control/protection: Vasectomy, I.U.D.     Comment: osmel     History   Drug Use No     Available pre-op labs reviewed.               Airway      Mallampati: III  Mouth opening: 3 FB  TM distance: > 6 cm   Cardiovascular             Dental  Comment: No loose teeth per patient               Pulmonary                     Other findings        ASA: 2   Plan: general  NPO status verified and     Post-procedure pain management plan discussed with surgeon and patient.    Comment: GETA/LMA discussed in  detail.  Risk of complications discussed including but not limited to sore throat, cough, PONV discussed. Also, discussed risks including dental injury particularly on any weakened, treated or diseased teeth & pt wishes to proceed  All questions answered.    Plan/risks discussed with: patient            Present on Admission:  **None**

## 2024-07-24 NOTE — H&P
No change in Dr. Dixon's H&P from 7/8. We reviewed the plan for the second stage which will include removal of bilateral breast tissue expanders, placement of smooth, round, silicone implants, and fat grafting to bilateral reconstructed breasts.  The nature of the procedure was reviewed with the patient.  We reviewed the risks of surgery including but not limited to bleeding, infection, scarring, delayed wound healing, asymmetry, implant malposition, implant infection or extrusion requiring removal, ALCL, capsular contracture, hypertrophic scarring or keloid, injury to intra-abdominal structures, contour abnormalities, cysts or calcifications requiring biopsy, and need for further surgery.  We reviewed the expected postoperative course including possible need for drains, as well as need for activity limitation and compression.  Multiple questions were answered the patient's satisfaction.  No guarantees as to outcome were offered.  The patient expresses understanding and wishes to proceed.

## 2024-07-24 NOTE — DISCHARGE INSTRUCTIONS
Plastic Surgery Home Care Instructions      We hope you were pleased with your care at [facility].  We wish you the best outcome and overall experience with your operation.  These instructions will help to minimize pain, optimize healing, and improve the likelihood of a successful result.    What To Expect  There will be some spotting of the incision lines for the next few days.  Your breasts will be swollen and might feel congested for the next 1-2 weeks  Temporary areas of numbness are typical in the early weeks following a breast procedure.  Normalization of sensation can typically take up to several months following the operation.  Mild bruising, mild swelling and discomfort in the areas of fat grafting is typical.   Please DO NOT apply heat or ice to the affected area    Bandages (Dressing)  Keep dressings clean and dry  Do not remove your bra unless for hygiene purposes - compression is key - especially at night. You can change to a supportive sports bra or camilsole if this provides good compression and you are more comfortable in that rather than the surgical bra. No underwire.   You are to wear your bra 24/7 for 6 weeks post-operatively.   Reinforce the dressing with insertion of additional padding as needed - this is not required - for your comfort only  Leave white steri-strips in place over incisions.  Wear abdominal binder and foam at all times for at least 3 days after surgery. After this, you can change to your own compression garments if they area more comfortable (such as Spanx etc). Wear some sort of abdominal compression at all times for at least 7 days after surgery. After 7 days, abdominal compression is not medically necessary, but compression can continue to help with swelling and prevent contour irregularities when worn for 6 weeks postoperatively.     Bathing/Showers  You can resume showering tomorrow. Let the soap and water run over incisions, do not scrub.   No baths, swimming, or hot  tubs until you receive medical permission    Pain Medication: Norco  Take one or two tablets every six hours as needed for pain.  Do not take narcotics, if you do not have pain. You can take Tylenol if you are not taking Norco. DO NOT take both Tylenol and Norco together as there is already Tylenol in the Norco.  Keep stools soft.  Take a stool softener (Metamucil, Milk of Magnesia, Colace).  Eating fruit will also help to prevent constipation    Antibiotics: Keflex 4x/day for 7 days  Antibiotics will help minimize the risk of a wound infection  Fill the prescription as directed   Follow the instructions as written on the bottle's label  Call our office if you experience nausea, rash, or other symptoms which might be a possible side effect.    Over-The-Counter Medication  Non-prescription anti-inflammatory medications can also help to ease the pain.  You can take Aleve or ibuprofen starting 48 hours after surgery  Take as directed on the bottle  Drink a full glass of water with the medication    Home Medication  Resume your home medications as instructed  Do not resume herbal medications for two weeks    Diet  Resume your normal diet    Activity  No strenuous activity or heavy lifting (no lifting over 10 pounds)  No activities that involve your pectoralis muscles (no planks, push-ups, etc)  You can go up and down the stairs as tolerated.   You cannot return to work, if your work requires strenuous activity.  You cannot return to physical exercise, sports, or gym workouts until you are allowed to participate in strenuous activity.    Driving  Do not drive, if you are taking pain medication.    Return to Work or School  You can return to work when you are not taking pain medication, if your work does not involve strenuous activity.  Contact the office, if you need a medical note.     Follow-up Appointment   Our office will call you to set up a time    Questions or Concerns  Call Dr. Spencer's office if you experience  severe pain not controlled by pain medication, swelling, numbness, tingling, bleeding, fever, or other concerns.   If he is not available, another physician will be able to address your concerns.    Mere     Thank you for coming to [facility] for your operation.  The nurses and the anesthesiologist try very hard to make sure you receive the best care possible.  Your trust in them is greatly appreciated.      Thanks so much,  Dr. Tj May, FNP-C

## 2024-07-24 NOTE — BRIEF OP NOTE
Pre-Operative Diagnosis: Absence of breast, bilateral [Z90.13]     Post-Operative Diagnosis: Absence of breast, bilateral [Z90.13]      Procedure Performed:   Second stage reconstruction with removal of bilateral breast tissue expanders, placement of permanent implants and autologous fat grafting to the bilateral breasts.    Surgeons and Role:     * Larry Spencer MD - Primary    Assistant(s):  APN: Gwendolyn May APRN     Surgical Findings: Nl     Specimen: Bilateral breast tissue     Estimated Blood Loss: Blood Output: 10 mL (7/24/2024  4:04 PM)          Larry Spencer MD  7/24/2024  4:14 PM

## 2024-07-24 NOTE — ANESTHESIA POSTPROCEDURE EVALUATION
[facility]    Mere Christine Patient Status:  Hospital Outpatient Surgery   Age/Gender 53 year old female MRN [id number]   Location [facility] SURGERY Attending Larry Spencer MD   Hosp Day # 0 PCP Yobany Dixon MD       Anesthesia Post-op Note    Second stage reconstruction with removal of bilateral breast tissue expanders, placement of permanent implants and autologous fat grafting to the bilateral breasts.    Procedure Summary       Date: 07/24/24 Room / Location:  MAIN OR 19 / [facility] MAIN OR    Anesthesia Start: 1350 Anesthesia Stop: 1626    Procedures:       Second stage reconstruction with removal of bilateral breast tissue expanders, placement of permanent implants and autologous fat grafting to the bilateral breasts. (Bilateral: Breast)      . (Bilateral: Breast) Diagnosis:       Absence of breast, bilateral      (Absence of breast, bilateral [Z90.13])    Surgeons: Larry Spencer MD Anesthesiologist: Nima Rabago MD    Anesthesia Type: general ASA Status: 2            Anesthesia Type: general    Vitals Value Taken Time   /72 07/24/24 1626   Temp 97 07/24/24 1626   Pulse 85 07/24/24 1626   Resp 19 07/24/24 1626   SpO2 99 07/24/24 1626       Patient Location: PACU    Anesthesia Type: general    Airway Patency: patent    Postop Pain Control: adequate    Mental Status: mildly sedated but able to meaningfully participate in the post-anesthesia evaluation    Nausea/Vomiting: none    Cardiopulmonary/Hydration status: stable euvolemic    Complications: no apparent anesthesia related complications    Postop vital signs: stable    Dental Exam: Unchanged from Preop    Patient to be discharged from PACU when criteria met.

## 2024-07-24 NOTE — ANESTHESIA PROCEDURE NOTES
Airway  Date/Time: 7/24/2024 2:02 PM  Urgency: elective    Airway not difficult    General Information and Staff    Patient location during procedure: OR  Anesthesiologist: Nima Rabago MD  Performed: anesthesiologist   Performed by: Nima Rabago MD  Authorized by: Nima Rabago MD      Indications and Patient Condition  Indications for airway management: anesthesia  Sedation level: deep  Preoxygenated: yes  Patient position: sniffing  Mask difficulty assessment: 1 - vent by mask    Final Airway Details  Final airway type: endotracheal airway      Successful airway: ETT  Cuffed: yes   Successful intubation technique: direct laryngoscopy  Endotracheal tube insertion site: oral  Blade: Jesus Manuel  Blade size: #3  ETT size (mm): 7.0    Cormack-Lehane Classification: grade I - full view of glottis  Placement verified by: capnometry   Cuff volume (mL): 6  Measured from: lips  ETT to lips (cm): 21  Number of attempts at approach: 1    Additional Comments  atraumatic

## 2024-07-25 ENCOUNTER — TELEPHONE (OUTPATIENT)
Dept: SURGERY | Facility: CLINIC | Age: 54
End: 2024-07-25

## 2024-07-25 NOTE — TELEPHONE ENCOUNTER
Patient contacted the office this morning to inquire about showering with the foam applied to her abdomen. Discussed with MARY Snow. Instructed patient to remove foam then reapply after drying off, and ensure to apply her compressive binder. Offered opportunity to ask questions, no additional questions at this time.

## 2024-07-25 NOTE — OPERATIVE REPORT
[facility]    PATIENT'S NAME: ORION MICHELLE   ATTENDING PHYSICIAN: Larry Spencer M.D.   OPERATING PHYSICIAN: Larry Spencer M.D.   PATIENT ACCOUNT#:   [id number]    LOCATION:  [facility] 2 [facility] 10  MEDICAL RECORD #:   [id number]       YOB: 1970  ADMISSION DATE:       07/24/2024      OPERATION DATE:  07/24/2024    OPERATIVE REPORT    PREOPERATIVE DIAGNOSIS:  History of bilateral mastectomy and tissue expander reconstruction.  POSTOPERATIVE DIAGNOSIS:  History of bilateral mastectomy and tissue expander reconstruction.  PROCEDURE:  1.   Removal of bilateral breast tissue expanders.  2.   Left breast capsulotomy.  3.   Placement of silicone gel implants, bilateral breasts.  4.   Autologous fat grafting to bilateral reconstructed breasts.    ASSISTANT:  MARY Coates    ANESTHESIA:  General.    ESTIMATED BLOOD LOSS:  Minimal.    COMPLICATIONS:  None.    INDICATIONS:  Patient is a 53-year-old female who previously underwent bilateral nipple-sparing mastectomy and prepectoral tissue expander reconstruction.  She completed uncomplicated expansion and now presents for exchange of permanent implants and fat grafting.    FINDINGS:  Bilateral breasts reconstructed with Natrelle Inspira cohesive breast implant, 485 mL, style SCF, reference SCF-485, on the right serial #[id number], on the left serial #[id number].    OPERATIVE TECHNIQUE:  Informed consent was obtained from the patient.  The risks, benefits, and alternatives were reviewed with the patient preoperatively.  She expressed understanding and wished to proceed.  The patient was marked in the preoperative holding area in the upright position.  She was then taken to the operating room, properly identified, and placed in supine position.  Sequential compression devices were placed on bilateral lower extremities.  Intravenous antibiotic prophylaxis was administered.  The patient then underwent successful induction of general  anesthesia and endotracheal intubation.  The arms were placed abducted on foam-padded cradles and loosely secured with Kerlix.  The chest and abdomen were prepped and draped sterilely.  The proposed liposuction port sites in the lateral abdomen were infiltrated with 1% lidocaine with epinephrine.  Stab incisions were then created in the lateral abdomen, and 1 L of tumescent solution was infiltrated into the central abdomen and flanks, staying remote from the patient's umbilical hernia.  Attention was then turned to the breasts.  Both breast scars were infiltrated with 1% lidocaine with epinephrine.  The procedure began on the right breast.  The scar was excised with a scalpel and sent for permanent pathologic analysis.  A superior subcutaneous flap was elevated sharply.  A transverse capsulotomy was then created, and an intact tissue expander was removed.  The pocket was inspected.  There was no periprosthetic fluid noted.  There were no visible or palpable nodules noted.  Complete incorporation of the acellular dermal matrix was noted.  Sizers were placed, and attention was turned to the left breast.  In a similar fashion, the left breast scar was excised and sent for permanent pathologic analysis.  A superior subcutaneous flap was elevated sharply.  A transverse capsulotomy was then created, and an intact tissue expander was removed.  The pocket was inspected.  There was no periprosthetic fluid noted.  There were no visible or palpable nodules noted.  Complete incorporation of the acellular dermal matrix was noted.  Sizers were placed, and attention was turned to the abdomen.  Using a 4 mm Mercedes-tip cannula, power-assisted liposuction of the central abdomen and flanks was performed.  Approximately 100 mL of lipoaspirate was collected using the [employer] system.  The fat was processed in the usual fashion.  The liposuction port sites were closed with 3-0 Vicryl deep dermal sutures.  With sizers in place and the  patient in the upright position, fat was grafted to the bilateral breasts.  A total of 25 mL was grafted to the right breast and 33 mL was grafted to the left breast.  Next, sizers of different volumes and projections were placed.  Ultimately, it appeared the style  mL implant gave the best size and shape in accordance with the patient's desires.  On the left side, some superior constriction of the capsule was noted; hence, the capsule was released superiorly from approximately the 10 o'clock position to the 1 o'clock position.  This facilitated appropriate expansion of the pocket.  Both pockets were rinsed with Betadine and then antibiotic irrigation until clear.  Hemostasis was checked and noted to be adequate.  The surgical sites were isolated with Ioban and gloves were changed.  The implants were brought on the field and immediately bathed in antibiotic irrigation.  They were checked for integrity and noted to be intact.  The implants were placed in the prepectoral space taking care to maintain their proper orientation.  The capsules were then closed with running 2-0 Vicryl suture.  The skin was temporarily stapled, and the patient was placed in the upright position.  Some lower pole skin excess was noted in the left breast, and this was tailor tacked, marked, and excised.  It was sent for permanent pathologic analysis.  The bilateral breast incisions were repaired in a layered fashion with 3-0 Vicryl deep dermal sutures and 4-0 Monocryl subcuticular suture.  Dermabond and Steri-Strips were placed on the incisions.  Fluff gauze and a surgical bra were placed on the breasts.  TopiFoam and an abdominal binder were placed on the abdomen.  The patient was awakened, extubated, and taken to the recovery area in stable condition.  There were no operative complications.  All needle, sponge, and instrument counts were correct at the end of the procedure.    Dictated By Larry Spencer M.D.  d: 07/24/2024  16:24:20  t: 07/24/2024 20:02:57  Job [id number]  [facility]/

## 2024-07-31 PROBLEM — I25.10 ATHEROSCLEROSIS OF NATIVE CORONARY ARTERY OF NATIVE HEART: Status: ACTIVE | Noted: 2024-07-31

## 2024-07-31 PROBLEM — I25.10 ATHEROSCLEROSIS OF NATIVE CORONARY ARTERY OF NATIVE HEART: Status: ACTIVE | Noted: 2022-12-02

## 2024-07-31 RX ORDER — ROSUVASTATIN CALCIUM 10 MG/1
10 TABLET, COATED ORAL
Qty: 26 TABLET | Refills: 1 | Status: SHIPPED | OUTPATIENT
Start: 2024-08-01

## 2024-08-02 ENCOUNTER — TELEPHONE (OUTPATIENT)
Dept: SURGERY | Facility: CLINIC | Age: 54
End: 2024-08-02

## 2024-08-02 NOTE — TELEPHONE ENCOUNTER
Patient contacted the office to ask if she is able to sleep on her side. Educated patient that she may sleep on her side so long as it is comfortable for her. Patient appreciated call. Reinforced compression and activity guidelines, and instructed patient to call the office with any additional questions or concerns.

## 2024-08-06 ENCOUNTER — OFFICE VISIT (OUTPATIENT)
Dept: SURGERY | Facility: CLINIC | Age: 54
End: 2024-08-06
Payer: COMMERCIAL

## 2024-08-06 DIAGNOSIS — Z90.13 ABSENCE OF BREAST, BILATERAL: Primary | ICD-10-CM

## 2024-08-06 PROCEDURE — 99024 POSTOP FOLLOW-UP VISIT: CPT

## 2024-08-06 NOTE — PROGRESS NOTES
Mere Christine is a 53 year old female who presents today for a follow-up after removal of bilateral breast tissue expanders, left breast capsulotomy, placement of silicone gel implants (Natrelle Inspira cohesive breast implant, 485 mL, style SCF, reference SCF-485 ), bilateral breasts, autologous fat grafting to bilateral reconstructed breasts on 7/24/2024 with Dr. Spencer.    She denies fever and chills. She denies nausea, vomiting, diarrhea or constipation.   Her pain is controlled.      Physical Exam     Breasts: Bilateral breast incisions are clean, dry, intact.  There is no evidence of hematoma or seroma bilaterally.  The bilateral mastectomy skin is without erythema.  Bilateral nipples are viable.  Acceptable shape and symmetry.  Slight ecchymosis present on bilateral breast consistent with fat grafting.    Abdomen: Soft and appropriately tender.  Fat grafting incisions are clean, dry, intact.  No evidence of hematoma or seroma.    There were no vitals filed for this visit.      Assessment and Plan     Mere Christine is doing well s/p removal of bilateral breast tissue expanders, left breast capsulotomy, placement of silicone gel implants (Natrelle Inspira cohesive breast implant, 485 mL, style SCF, reference SCF-485 ), bilateral breasts, autologous fat grafting to bilateral reconstructed breasts on 7/24/2024 with Dr. Spencer.    Patient is here today for wound check.  The bilateral breast incisions were redressed with new Steri-Strips.  The abdominal fat grafting incisions were left open to air today.  Compression and activity guidelines reviewed with the patient.      She is following up with Dr. Spencer on 9-3 for wound check.  She was encouraged to contact the office with any questions or concerns.    Questions were answered. Patient understands.     Bonny ABAD RN  8/6/2024  9:22 AM

## 2024-09-03 ENCOUNTER — OFFICE VISIT (OUTPATIENT)
Dept: SURGERY | Facility: CLINIC | Age: 54
End: 2024-09-03
Payer: COMMERCIAL

## 2024-09-03 DIAGNOSIS — Z90.13 ABSENCE OF BREAST, BILATERAL: Primary | ICD-10-CM

## 2024-09-03 PROCEDURE — 99024 POSTOP FOLLOW-UP VISIT: CPT | Performed by: SURGERY

## 2024-09-03 NOTE — PROGRESS NOTES
Mere Christine is a 53 year old female who presents today in follow-up after undergoing exchange to style  cc implants on 7/24. She denies fever and chills. She reports a contour dramality at the lateral aspect of her right breast.    Physical Examination:  Breasts:Bilateral breast incisions are clean dry and intact.  There is no erythema or seroma noted.  Good shape and symmetry is noted.      Assessment and Plan:  Patient is doing well.  We discussed scar care including massage and moisturizer and silicone products.  The patient may resume regular activity as tolerated.  She will follow-up in 6 months for scar check.  The plan was reviewed with the patient and questions were answered.

## 2024-10-15 ENCOUNTER — OFFICE VISIT (OUTPATIENT)
Facility: CLINIC | Age: 54
End: 2024-10-15
Payer: COMMERCIAL

## 2024-10-15 VITALS
WEIGHT: 140 LBS | DIASTOLIC BLOOD PRESSURE: 66 MMHG | HEIGHT: 67 IN | SYSTOLIC BLOOD PRESSURE: 126 MMHG | HEART RATE: 57 BPM | BODY MASS INDEX: 21.97 KG/M2

## 2024-10-15 DIAGNOSIS — Z30.432 ENCOUNTER FOR REMOVAL OF INTRAUTERINE CONTRACEPTIVE DEVICE: ICD-10-CM

## 2024-10-15 DIAGNOSIS — Z23 NEED FOR VACCINATION: ICD-10-CM

## 2024-10-15 DIAGNOSIS — Z01.419 WELL WOMAN EXAM WITH ROUTINE GYNECOLOGICAL EXAM: Primary | ICD-10-CM

## 2024-10-15 DIAGNOSIS — Z12.4 PAPANICOLAOU SMEAR FOR CERVICAL CANCER SCREENING: ICD-10-CM

## 2024-10-15 PROCEDURE — 87624 HPV HI-RISK TYP POOLED RSLT: CPT | Performed by: OBSTETRICS & GYNECOLOGY

## 2024-10-15 PROCEDURE — 90471 IMMUNIZATION ADMIN: CPT | Performed by: OBSTETRICS & GYNECOLOGY

## 2024-10-15 PROCEDURE — 99396 PREV VISIT EST AGE 40-64: CPT | Performed by: OBSTETRICS & GYNECOLOGY

## 2024-10-15 PROCEDURE — 58301 REMOVE INTRAUTERINE DEVICE: CPT | Performed by: OBSTETRICS & GYNECOLOGY

## 2024-10-15 PROCEDURE — 3008F BODY MASS INDEX DOCD: CPT | Performed by: OBSTETRICS & GYNECOLOGY

## 2024-10-15 PROCEDURE — 3078F DIAST BP <80 MM HG: CPT | Performed by: OBSTETRICS & GYNECOLOGY

## 2024-10-15 PROCEDURE — 88175 CYTOPATH C/V AUTO FLUID REDO: CPT | Performed by: OBSTETRICS & GYNECOLOGY

## 2024-10-15 PROCEDURE — 3074F SYST BP LT 130 MM HG: CPT | Performed by: OBSTETRICS & GYNECOLOGY

## 2024-10-15 PROCEDURE — 90656 IIV3 VACC NO PRSV 0.5 ML IM: CPT | Performed by: OBSTETRICS & GYNECOLOGY

## 2024-10-15 RX ORDER — ROSUVASTATIN CALCIUM 10 MG/1
TABLET, COATED ORAL
COMMUNITY

## 2024-10-15 NOTE — PROGRESS NOTES
Mere Christine is a 54 year old female  Patient's last menstrual period was 2023 (approximate).   Chief Complaint   Patient presents with    Wellness Visit     WWE    Procedure     IUD removal  - Carmen 2021   .     She has not had any vaginal bleeding for over a year she has some hot flashes and night sweats but does not want any prescriptions she is exercising.  Taking vitamin D.  She has not done a colon school screening and is planned to do that this year information was given.  Blood work is ordered from her call her cardiologist.    She had bilateral mastectomies no cancer was found she was not recommended to have genetic testing    OBSTETRICS HISTORY:  OB History    Para Term  AB Living   2 2 2     2   SAB IAB Ectopic Multiple Live Births           2      # Outcome Date GA Lbr Javan/2nd Weight Sex Type Anes PTL Lv   2 Term 10/19/04 39w0d  8 lb (3.629 kg) F NORMAL SPONT   KAREN   1 Term 01 40w0d  8 lb 4 oz (3.742 kg) F NORMAL SPONT   KAREN       GYNE HISTORY:  Periods absent    History   Sexual Activity    Sexual activity: Yes    Partners: Male    Birth control/ protection: Vasectomy, I.U.D.     Comment: carmen        Pap Date: 21  Pap Result Notes: Negative        MEDICAL HISTORY:  Past Medical History:    Asthma (HCC)    Chronic rhinitis    High cholesterol    Hx of breast biopsy    Pap smear for cervical cancer screening    negative hpv negative    PONV (postoperative nausea and vomiting)    Thalassemia minor    Vertigo    recent onset-intermittent    Vertigo    Visual impairment       SURGICAL HISTORY:  Past Surgical History:   Procedure Laterality Date    Cyst removal      EH    Implantable breast prosthesis      Dereck biopsy stereo nodule 1 site left (cpt=19081)      benign    Dereck biopsy stereo nodule 1 site right (cpt=19081)  2017    ALH    Needle biopsy left  2018    benign    Other surgical history  2023    Prophylactic bilateral breast  nipple sparing mastectomies (Maria M) Immediate breast reconstruction with placement of bilateral breast tissue expanders with acellular dermal matrix, possible direct to bilateral breast implants (Tj) Debbie Franz MD  BREAST RECONSTRUCTION/ IMMEDIATE/EXPANDER  Larry Spencer MD    Other surgical history  07/24/2024    Second stage reconstruction with removal of bilateral breast tissue expanders, placement of permanent implants and autologous fat grafting to the bilateral breasts.  Larry Spencer MD    Removal of ovarian cyst(s) Left     Us breast biopsy 1 site left (cpt=19083) Left [id number]    benign       SOCIAL HISTORY:  Social History     Socioeconomic History    Marital status:      Spouse name: Not on file    Number of children: Not on file    Years of education: Not on file    Highest education level: Not on file   Occupational History    Not on file   Tobacco Use    Smoking status: Never    Smokeless tobacco: Never   Vaping Use    Vaping status: Never Used   Substance and Sexual Activity    Alcohol use: Yes     Comment: social    Drug use: No    Sexual activity: Yes     Partners: Male     Birth control/protection: Vasectomy, I.U.D.     Comment: osmel   Other Topics Concern    Caffeine Concern Not Asked    Exercise Not Asked    Seat Belt Not Asked    Special Diet Not Asked    Stress Concern Not Asked    Weight Concern Not Asked   Social History Narrative    Not on file     Social Drivers of Health     Financial Resource Strain: Not on file   Food Insecurity: Not on file   Transportation Needs: Not on file   Physical Activity: Low Risk  (7/2/2024)    Received from [facility]    Exercise Vital Sign     On average, how many days per week do you engage in moderate to strenuous exercise (like a brisk walk)?: 3 days     On average, how many minutes do you engage in exercise at this level?: 60 min   Stress: Not on file   Social Connections: Not on file   Housing Stability: Not on file        FAMILY HISTORY:  Family History   Problem Relation Age of Onset    Other (thalsemmia minor) Father     Diabetes Mother     Endometriosis Mother     No Known Problems Daughter     No Known Problems Daughter     No Known Problems Maternal Grandmother     No Known Problems Maternal Grandfather     No Known Problems Paternal Grandmother     No Known Problems Paternal Grandfather     Pancreatic Cancer Brother     Other (Other) Brother     Breast Cancer Neg     Ovarian Cancer Neg     Uterine Cancer Neg     Colon Cancer Neg     Prostate Cancer Neg        MEDICATIONS:    Current Outpatient Medications:     EVENING PRIMROSE OIL OR, Take by mouth., Disp: , Rfl:     FOLIC ACID OR, Take by mouth., Disp: , Rfl:     Albuterol Sulfate HFA (PROAIR HFA) 108 (90 Base) MCG/ACT Inhalation Aero Soln, Inhale 2 puffs into the lungs every 6 (six) hours as needed for Wheezing., Disp: 3 Inhaler, Rfl: 0    cetirizine 10 MG Oral Tab, Take 1 tablet (10 mg total) by mouth daily., Disp: , Rfl:     rosuvastatin 10 MG Oral Tab, TAKE 1 TABLET BY MOUTH 2 DAYS A WEEK. TAKE ON MONDAYS AND THURSDAYS., Disp: , Rfl:     Lysine 500 MG Oral Tab, Take by mouth., Disp: , Rfl:     levonorgestrel (BRII) 13.5 MG Intrauterine IUD, 1 each by Intrauterine route one time., Disp: , Rfl:     Cholecalciferol (VITAMIN D) 50 MCG (2000 UT) Oral Cap, Take by mouth., Disp: , Rfl:     ALLERGIES:  Allergies[1]      Review of Systems:  Constitutional:  Denies fatigue, night sweats, hot flashes  Gastrointestinal:  denies heartburn, abdominal pain, diarrhea or constipation  Genitourinary:  denies dysuria, incontinence, abnormal vaginal discharge, vaginal itching  Skin/Breast:  Denies any breast pain, lumps, or discharge.   Neurological:  denies headaches, extremity weakness or numbness.      PHYSICAL EXAM:   Constitutional: well developed, well nourished  Head/Face: normocephalic  Neck/Thyroid: thyroid symmetric, no thyromegaly, no nodules, no adenopathy  Breast: normal  without palpable masses, tenderness, asymmetry, nipple discharge, nipple retraction or skin changes  Abdomen:  soft, nontender, nondistended, no masses  Skin/Hair: no unusual rashes or bruises   Extremities: no edema, no cyanosis  Psychiatric:  Oriented to time, place, person and situation. Appropriate mood and affect    Pelvic Exam:  External Genitalia: normal appearance, hair distribution, and no lesions  Urethral Meatus:  normal in size, location, without lesions and prolapse  Bladder:  No fullness, masses or tenderness  Vagina:  Normal appearance without lesions, no abnormal discharge  Cervix:  Normal without tenderness on motion without lesions Pap strings seen  Uterus: normal in size, contour, position, mobility, without tenderness  Adnexa: normal without masses or tenderness  Perineum: normal  Anus: no hemorroids     Assessment & Plan:  Mere was seen today for wellness visit and procedure.    Diagnoses and all orders for this visit:    Well woman exam with routine gynecological exam    Papanicolaou smear for cervical cancer screening  -     ThinPrep PAP with HPV Reflex Request B; Future                       [1]   Allergies  Allergen Reactions    Seasonal OTHER (SEE COMMENTS)    Adhesive Tape RASH     No problem with steri strips

## 2024-10-15 NOTE — PROGRESS NOTES
IUD Removal     Consent signed.    Procedure discussed with the patient in detail including indication, risks, benefits, alternatives and complications.    Pelvic Exam Findings:  Lesion description:  IUD strings seen from cervix    Procedure:  Speculum placed in the vagina.  Betadine wash of vagina and cervix.  An Endocervical speculum was used to visualize strings.  A clamp used to grasp IUD strings.  Carmen IUD was removed without difficulty.  The patient tolerated the procedure well.      Visit Plan:  Discharge instructions were reviewed with the patient.

## 2024-10-21 LAB
.: NORMAL
.: NORMAL

## 2024-10-22 LAB — HPV E6+E7 MRNA CVX QL NAA+PROBE: NEGATIVE

## 2024-11-14 ENCOUNTER — LAB SERVICES (OUTPATIENT)
Dept: LAB | Age: 54
End: 2024-11-14

## 2024-11-14 DIAGNOSIS — Z82.49 FAMILY HISTORY OF PREMATURE CAD: ICD-10-CM

## 2024-11-14 DIAGNOSIS — R93.1 ELEVATED CORONARY ARTERY CALCIUM SCORE: ICD-10-CM

## 2024-11-14 DIAGNOSIS — R07.2 PRECORDIAL PAIN: ICD-10-CM

## 2024-11-14 DIAGNOSIS — I25.10 ATHEROSCLEROSIS OF NATIVE CORONARY ARTERY OF NATIVE HEART, UNSPECIFIED WHETHER ANGINA PRESENT: ICD-10-CM

## 2024-11-14 LAB
ALBUMIN SERPL-MCNC: 4.7 G/DL (ref 3.4–5)
ALBUMIN/GLOB SERPL: 1.6 {RATIO} (ref 1–2.4)
ALP SERPL-CCNC: 45 UNITS/L (ref 45–117)
ALT SERPL-CCNC: 21 UNITS/L
ANION GAP SERPL CALC-SCNC: 7 MMOL/L (ref 7–19)
AST SERPL-CCNC: 23 UNITS/L
BILIRUB SERPL-MCNC: 0.9 MG/DL (ref 0.2–1)
BUN SERPL-MCNC: 14 MG/DL (ref 6–20)
BUN/CREAT SERPL: 16 (ref 7–25)
CALCIUM SERPL-MCNC: 9.9 MG/DL (ref 8.4–10.2)
CHLORIDE SERPL-SCNC: 108 MMOL/L (ref 97–110)
CHOLEST SERPL-MCNC: 149 MG/DL
CHOLEST/HDLC SERPL: 1.5 {RATIO}
CO2 SERPL-SCNC: 28 MMOL/L (ref 21–32)
CREAT SERPL-MCNC: 0.89 MG/DL (ref 0.51–0.95)
EGFRCR SERPLBLD CKD-EPI 2021: 78 ML/MIN/{1.73_M2}
FASTING DURATION TIME PATIENT: 13 HOURS (ref 0–999)
GLOBULIN SER-MCNC: 3 G/DL (ref 2–4)
GLUCOSE SERPL-MCNC: 90 MG/DL (ref 70–99)
HDLC SERPL-MCNC: 100 MG/DL
LDLC SERPL CALC-MCNC: 37 MG/DL
NONHDLC SERPL-MCNC: 49 MG/DL
POTASSIUM SERPL-SCNC: 4.8 MMOL/L (ref 3.4–5.1)
PROT SERPL-MCNC: 7.7 G/DL (ref 6.4–8.2)
SODIUM SERPL-SCNC: 138 MMOL/L (ref 135–145)
TRIGL SERPL-MCNC: 60 MG/DL

## 2024-11-14 PROCEDURE — 80061 LIPID PANEL: CPT | Performed by: CLINICAL MEDICAL LABORATORY

## 2024-11-14 PROCEDURE — 80053 COMPREHEN METABOLIC PANEL: CPT | Performed by: CLINICAL MEDICAL LABORATORY

## 2024-11-14 PROCEDURE — 36415 COLL VENOUS BLD VENIPUNCTURE: CPT | Performed by: INTERNAL MEDICINE

## 2025-01-14 ENCOUNTER — APPOINTMENT (OUTPATIENT)
Dept: CARDIOLOGY | Age: 55
End: 2025-01-14

## 2025-01-26 DIAGNOSIS — R07.2 PRECORDIAL PAIN: ICD-10-CM

## 2025-01-26 DIAGNOSIS — R93.1 ELEVATED CORONARY ARTERY CALCIUM SCORE: ICD-10-CM

## 2025-01-26 DIAGNOSIS — Z82.49 FAMILY HISTORY OF PREMATURE CAD: ICD-10-CM

## 2025-01-26 DIAGNOSIS — I25.10 ATHEROSCLEROSIS OF NATIVE CORONARY ARTERY OF NATIVE HEART, UNSPECIFIED WHETHER ANGINA PRESENT: ICD-10-CM

## 2025-01-27 RX ORDER — ROSUVASTATIN CALCIUM 10 MG/1
TABLET, COATED ORAL
Qty: 26 TABLET | Refills: 3 | Status: SHIPPED | OUTPATIENT
Start: 2025-01-27

## 2025-02-05 RX ORDER — POLYETHYLENE GLYCOL 3350, SODIUM CHLORIDE, SODIUM BICARBONATE, POTASSIUM CHLORIDE 420; 11.2; 5.72; 1.48 G/4L; G/4L; G/4L; G/4L
POWDER, FOR SOLUTION ORAL
COMMUNITY
Start: 2024-12-18 | End: 2025-02-10 | Stop reason: ALTCHOICE

## 2025-02-10 ENCOUNTER — APPOINTMENT (OUTPATIENT)
Dept: CARDIOLOGY | Age: 55
End: 2025-02-10

## 2025-02-10 VITALS
HEIGHT: 67 IN | HEART RATE: 68 BPM | WEIGHT: 140 LBS | DIASTOLIC BLOOD PRESSURE: 79 MMHG | BODY MASS INDEX: 21.97 KG/M2 | SYSTOLIC BLOOD PRESSURE: 133 MMHG

## 2025-02-10 DIAGNOSIS — R93.1 ELEVATED CORONARY ARTERY CALCIUM SCORE: Primary | ICD-10-CM

## 2025-02-10 DIAGNOSIS — I25.10 ATHEROSCLEROSIS OF NATIVE CORONARY ARTERY OF NATIVE HEART, UNSPECIFIED WHETHER ANGINA PRESENT: ICD-10-CM

## 2025-02-10 DIAGNOSIS — I25.10 ATHEROSCLEROSIS OF NATIVE CORONARY ARTERY OF NATIVE HEART WITHOUT ANGINA PECTORIS: ICD-10-CM

## 2025-02-10 DIAGNOSIS — R07.2 PRECORDIAL PAIN: ICD-10-CM

## 2025-02-10 DIAGNOSIS — Z82.49 FAMILY HISTORY OF PREMATURE CAD: ICD-10-CM

## 2025-02-10 PROCEDURE — 99214 OFFICE O/P EST MOD 30 MIN: CPT | Performed by: INTERNAL MEDICINE

## 2025-02-10 RX ORDER — ROSUVASTATIN CALCIUM 10 MG/1
10 TABLET, COATED ORAL
Qty: 26 TABLET | Refills: 3 | Status: SHIPPED | OUTPATIENT
Start: 2025-02-10

## 2025-02-10 SDOH — HEALTH STABILITY: PHYSICAL HEALTH: ON AVERAGE, HOW MANY DAYS PER WEEK DO YOU ENGAGE IN MODERATE TO STRENUOUS EXERCISE (LIKE A BRISK WALK)?: 3 DAYS

## 2025-02-10 SDOH — HEALTH STABILITY: PHYSICAL HEALTH: ON AVERAGE, HOW MANY MINUTES DO YOU ENGAGE IN EXERCISE AT THIS LEVEL?: 50 MIN

## 2025-02-10 ASSESSMENT — ENCOUNTER SYMPTOMS
WEIGHT LOSS: 0
COUGH: 0
BRUISES/BLEEDS EASILY: 0
ALLERGIC/IMMUNOLOGIC COMMENTS: NO NEW FOOD ALLERGIES
WEIGHT GAIN: 0
CHILLS: 0
FEVER: 0
HEMOPTYSIS: 0
SUSPICIOUS LESIONS: 0
HEMATOCHEZIA: 0

## 2025-02-10 ASSESSMENT — PATIENT HEALTH QUESTIONNAIRE - PHQ9
CLINICAL INTERPRETATION OF PHQ2 SCORE: NO FURTHER SCREENING NEEDED
SUM OF ALL RESPONSES TO PHQ9 QUESTIONS 1 AND 2: 0
1. LITTLE INTEREST OR PLEASURE IN DOING THINGS: NOT AT ALL
SUM OF ALL RESPONSES TO PHQ9 QUESTIONS 1 AND 2: 0
2. FEELING DOWN, DEPRESSED OR HOPELESS: NOT AT ALL

## 2025-02-25 ENCOUNTER — OFFICE VISIT (OUTPATIENT)
Dept: SURGERY | Facility: CLINIC | Age: 55
End: 2025-02-25
Payer: COMMERCIAL

## 2025-02-25 VITALS
TEMPERATURE: 97 F | RESPIRATION RATE: 16 BRPM | WEIGHT: 141 LBS | SYSTOLIC BLOOD PRESSURE: 131 MMHG | HEART RATE: 65 BPM | BODY MASS INDEX: 22 KG/M2 | OXYGEN SATURATION: 99 % | DIASTOLIC BLOOD PRESSURE: 77 MMHG

## 2025-02-25 DIAGNOSIS — J45.21 MILD INTERMITTENT ASTHMA WITH EXACERBATION (HCC): ICD-10-CM

## 2025-02-25 DIAGNOSIS — N60.99 ATYPICAL HYPERPLASIA OF BREAST: Primary | ICD-10-CM

## 2025-02-25 DIAGNOSIS — Z90.13 ABSENCE OF BREAST, BILATERAL: ICD-10-CM

## 2025-02-25 PROCEDURE — 99213 OFFICE O/P EST LOW 20 MIN: CPT | Performed by: SURGERY

## 2025-02-25 PROCEDURE — 3075F SYST BP GE 130 - 139MM HG: CPT | Performed by: SURGERY

## 2025-02-25 PROCEDURE — 3078F DIAST BP <80 MM HG: CPT | Performed by: SURGERY

## 2025-02-28 NOTE — PROGRESS NOTES
Breast Surgery Surveillance Visit    Diagnosis: Atypical ductal hyperplasia, right breast, s/p excisional biopsy on 6/16/2023. ADH, right breast, ADH and ALH, left breast, s/p prophylactic bilateral breast nipple sparing mastectomies with immediate reconstruction on 11/16/2023    Stage: N/A    Disease Status:  Surgical treatment complete    History of Present Illness:   Ms. Mere Christine is a 54 year old woman who presented with a right imaging detected breast mass/calcifications. She was referred for diagnostic imaging which is detailed below. She denies any palpable masses, nipple discharge, skin changes or axillary adenopathy. She does not have breast pain. She does not have significant past history for breast diseases or breast biopsy. She does not have family history of breast cancer.     She has a personal h/o breast augmentation several years ago with subpectoral silicone implants. She underwent Screening mammogram on January 27, 2017 which showed heterogeneously dense breasts with no concerns on the Left but with new calcifications on the Right. She returned for additional Right imaging that confirmed the calcifications and stereotactic biopsy was recommended. This was performed on February 8, 2017 and her pathology showed sclerosing adenosis with incidental focal ALH. Postsurgical changes of bilateral subpectoral silicone implants.  Since her last visit, the patient has had no new clinical findings.  She underwent a bilateral diagnostic evaluation in December 2018 that showed a dense breast tissue with a left breast 8 mm nodule seen at 1:00, 6 cm from the nipple for which biopsy was recommended.  Left breast ultrasound-guided biopsy was performed on December 12, 2018 and pathology showed benign stromal fibrosis associated with calcifications.  The patient was advised to undergo high risk surveillance.  She did have an MRI at one point that showed no suspicious findings bilaterally.  Her mammogram  in 2021, which identified a new grouping of calcifications. Pathology showed atypical lobular hyperplasia. Most recent mammogram on 4/3/2023 showed calcifications in the upper outer right breast. This area underwent biopsy and was found to be atypical ductal hyperplasia.  She underwent excisional biopsy, which occurred without complication. She underwent bilateral mastectomies which occurred without complication. She has healed well with no specific concerns related to bilateral chest wall since her last visit.  She is here today for evaluation and recommendations for further therapy.        Past Medical History:    Asthma (HCC)    Chronic rhinitis    High cholesterol    Hx of breast biopsy    Pap smear for cervical cancer screening    negative hpv negative    PONV (postoperative nausea and vomiting)    Thalassemia minor    Vertigo    recent onset-intermittent    Vertigo    Visual impairment     Past Surgical History:   Procedure Laterality Date    Cyst removal      EH    Implantable breast prosthesis      Dereck biopsy stereo nodule 1 site left (cpt=19081)      benign    Dereck biopsy stereo nodule 1 site right (cpt=19081)  2017    ALH    Needle biopsy left  2018    benign    Other surgical history  2023    Prophylactic bilateral breast nipple sparing mastectomies (Maria M) Immediate breast reconstruction with placement of bilateral breast tissue expanders with acellular dermal matrix, possible direct to bilateral breast implants (Tj) Debbie Franz MD  BREAST RECONSTRUCTION/ IMMEDIATE/EXPANDER  Larry Spencer MD    Other surgical history  2024    Second stage reconstruction with removal of bilateral breast tissue expanders, placement of permanent implants and autologous fat grafting to the bilateral breasts.  Larry Spencer MD    Removal of ovarian cyst(s) Left     Us breast biopsy 1 site left (cpt=19083) Left [id number]    benign     Gynecological History:  Pt is a   Pt was 30 years  old at time of first pregnancy.    She denies any cumulative breastfeeding history.  She achieved menarche at age 14 and LMP     She has history of oral contraceptive use for 6 years, last in 1995.  She denies infertility treatment to achieve pregnancy.    Medications:     rosuvastatin 10 MG Oral Tab TAKE 1 TABLET BY MOUTH 2 DAYS A WEEK. TAKE ON MONDAYS AND THURSDAYS.      EVENING PRIMROSE OIL OR Take by mouth.      Lysine 500 MG Oral Tab Take by mouth.      levonorgestrel (BRII) 13.5 MG Intrauterine IUD 1 each by Intrauterine route one time.      FOLIC ACID OR Take by mouth.      Cholecalciferol (VITAMIN D) 50 MCG (2000 UT) Oral Cap Take by mouth.      Albuterol Sulfate HFA (PROAIR HFA) 108 (90 Base) MCG/ACT Inhalation Aero Soln Inhale 2 puffs into the lungs every 6 (six) hours as needed for Wheezing. 3 Inhaler 0    cetirizine 10 MG Oral Tab Take 1 tablet (10 mg total) by mouth daily.       Allergies:    Allergies   Allergen Reactions    Seasonal OTHER (SEE COMMENTS)    Adhesive Tape RASH     No problem with steri strips       Family History:   Family History   Problem Relation Age of Onset    Other (thalsemmia minor) Father     Diabetes Mother     Endometriosis Mother     No Known Problems Daughter     No Known Problems Daughter     No Known Problems Maternal Grandmother     No Known Problems Maternal Grandfather     No Known Problems Paternal Grandmother     No Known Problems Paternal Grandfather     Pancreatic Cancer Brother     Other (Other) Brother     Breast Cancer Neg     Ovarian Cancer Neg     Uterine Cancer Neg     Colon Cancer Neg     Prostate Cancer Neg    She is not of Ashkenazi Rastafarian ancestry.    Social History:  History   Alcohol Use    Yes     Comment: social       History   Smoking Status    Never   Smokeless Tobacco    Never     The patient is . She has 2 children. She is employed full time.     Review of Systems:  General:   The patient denies, fever, chills, night sweats, fatigue,  generalized weakness, change in appetite or weight loss.    HEENT:     The patient denies eye irritation, cataracts, redness, glaucoma, yellowing of the eyes, change in vision or color blindness. The patient denies hearing loss, ringing in the ears, ear drainage, earaches, nasal congestion, nose bleeds, snoring, pain in mouth/throat, hoarseness, change in voice, facial trauma.    Respiratory:  The patient denies chronic cough, phlegm, hemoptysis, pleurisy/chest pain, pneumonia, asthma, wheezing, difficulty in breathing with exertion, emphysema, chronic bronchitis, shortness of breath or abnormal sound when breathing.     Cardiovascular:  There is no history of chest pain, chest pressure/discomfort, palpitations, irregular heartbeat, fainting or near-fainting, difficulty breathing when lying flat, SOB/Coughing at night, swelling of the legs or chest pain while walking.    Breasts:  See history of present illness    Gastrointestinal:     There is no history of difficulty or pain with swallowing, reflux symptoms, vomiting, dark or bloody stools, constipation, yellowing of the skin, indigestion, nausea, change in bowel habits, diarrhea, abdominal pain or vomiting blood.     Genitourinary:  The patient denies frequent urination, needing to get up at night to urinate, urinary hesitancy or retaining urine, painful urination, urinary incontinence, decreased urine stream, blood in the urine or vaginal/penile discharge.    Skin:    The patient denies rash, itching, skin lesions, dry skin, change in skin color or change in moles.     Hematologic/Lymphatic:  The patient denies easily bruising or bleeding or persistent swollen glands or lymph nodes.     Musculoskeletal:  The patient denies muscle aches/pain, joint pain, stiff joints, neck pain, back pain or bone pain.    Neuropsychiatric:  There is no history of migraines or severe headaches, seizure/epilepsy, speech problems, coordination problems, trembling/tremors,  fainting/black outs, dizziness, memory problems, loss of sensation/numbness, problems walking, weakness, tingling or burning in hands/feet. There is no history of abusive relationship, bipolar disorder, sleep disturbance, anxiety, depression or feeling of despair.    Endocrine:    There is no history of poor/slow wound healing, weight loss/gain, fertility or hormone problems, cold intolerance, thyroid disease.     Allergic/Immunologic:  There is no history of hives, hay fever, angioedema or anaphylaxis.    /77 (BP Location: Right arm, Patient Position: Sitting, Cuff Size: adult)   Pulse 65   Temp 97 °F (36.1 °C) (Temporal)   Resp 16   Wt 64 kg (141 lb)   LMP 08/31/2023 (Approximate)   SpO2 99%   BMI 22.08 kg/m²     Physical Exam:  The patient is an alert, oriented, well-nourished and  well-developed woman who appears her stated age. Her speech patterns and movements are normal. Her affect is appropriate.    HEENT: The head is normocephalic. The neck is supple. The thyroid is not enlarged and is without palpable masses/nodules. There are no palpable masses. The trachea is in the midline. Conjunctiva are clear, non-icteric.    Chest: The chest expands symmetrically. The lungs are clear to auscultation.    Heart: The rhythm is regular.  There are no murmurs, rubs, gallops or thrills.    Breasts:  Bilateral breasts are surgically absent.  There are no palpable chest wall nodules, masses, skin changes and/or axillary adenopathy appreciated bilaterally with intact reconstruction.    Abdomen:  The abdomen is soft, flat and non tender. The liver is not enlarged. There are no palpable masses.    Lymph Nodes:  The supraclavicular, axillary and cervical regions are free of significant lymphadenopathy.    Back: There is no vertebral column tenderness.    Skin: The skin appears normal. There are no suspicious appearing rashes or lesions.    Extremities: The extremities are without deformity, cyanosis or  edema.    Impression:   Ms. Mere Christine is a 54 year old woman presents with h/o right breast ALH and left breast ALH with new diagnosis of right breast ADH, s/p excisional biopsy now status post bilateral preventative mastectomies.     Recommendations:   I had a discussion with the Patient regarding her breast exam.  She is healing well since surgery with no signs of new or recurrent concerns. I personally reviewed her pathology.  Pathology confirmed residual atypia in both breast with no other clinical findings.  No further treatment will be needed for this in the setting of bilateral mastectomy.  She will follow with plastic surgery for further management of  reconstructive needs.  She will need no further mammograms status post bilateral mastectomies and I have advised she return to see me for her next clinical exam in 12 months.  She was given ample opportunity for questions and those questions were answered to her satisfaction. She was encouraged to contact the office with any questions or concerns prior to her next scheduled appointment.     This encounter lasted a total of 25 minutes, more than 50% of which was dedicated to the discussion of management options.

## 2025-03-14 ENCOUNTER — OFFICE VISIT (OUTPATIENT)
Dept: SURGERY | Facility: CLINIC | Age: 55
End: 2025-03-14
Payer: COMMERCIAL

## 2025-03-14 DIAGNOSIS — Z90.13 ABSENCE OF BREAST, BILATERAL: Primary | ICD-10-CM

## 2025-03-14 PROCEDURE — 99212 OFFICE O/P EST SF 10 MIN: CPT | Performed by: SURGERY

## 2025-03-14 NOTE — PROGRESS NOTES
Mere Christine is a 54 year old female who presents today in follow-up after undergoing placement of style  cc implants in July 2024.  She reports a mild pulling sensation of her right breast.    Physical Examination:  HEENT: There is no cervical or supraclavicular lymphadenopathy noted.    Breasts: Bilateral breasts are soft without palpable masses.  There is no nipple inversion or nipple discharge noted.  There is no axillary lymphadenopathy noted bilaterally.  There is no erythema or seroma noted.    Assessment and Plan:  Patient is doing well.  We reviewed the recommended FDA surveillance protocol for silicone implants.  We discussed continuing regular self breast examination with a plan for follow-up in 1 year or sooner if any changes are detected.  The plan was reviewed with the patient and questions were answered.

## 2026-02-06 ENCOUNTER — APPOINTMENT (OUTPATIENT)
Dept: CARDIOLOGY | Age: 56
End: 2026-02-06

## (undated) DEVICE — SLEEVE COMPR MD KNEE LEN SGL USE KENDALL SCD

## (undated) DEVICE — SLEEVE KENDALL SCD EXPRESS MED

## (undated) DEVICE — SOLUTION IRRIG 1000ML 0.9% NACL USP BTL

## (undated) DEVICE — SUT MONOCRYL 4-0 PS-2 Y496G

## (undated) DEVICE — GOWN,SIRUS,FABRIC-REINFORCED,LARGE: Brand: MEDLINE

## (undated) DEVICE — PROXIMATE SKIN STAPLERS (35 WIDE) CONTAINS 35 STAINLESS STEEL STAPLES (FIXED HEAD): Brand: PROXIMATE

## (undated) DEVICE — SYRINGE MED 60ML LL TIP DISP

## (undated) DEVICE — SUT ETH 18IN BLK ET663H

## (undated) DEVICE — DRAPE,TOWEL,LARGE,INVISISHIELD: Brand: MEDLINE

## (undated) DEVICE — VIOLET BRAIDED (POLYGLACTIN 910), SYNTHETIC ABSORBABLE SUTURE: Brand: COATED VICRYL

## (undated) DEVICE — MARKER SKIN PREP RESIST STRL

## (undated) DEVICE — Device

## (undated) DEVICE — 40580 - THE PINK PAD - ADVANCED TRENDELENBURG POSITIONING KIT: Brand: 40580 - THE PINK PAD - ADVANCED TRENDELENBURG POSITIONING KIT

## (undated) DEVICE — CG INFILTRATION TUBING: Brand: CG INFILTRATION TUBING

## (undated) DEVICE — BREAST-HERNIA-PORT CDS-LF: Brand: MEDLINE INDUSTRIES, INC.

## (undated) DEVICE — AEGIS 1" DISK 4MM HOLE, PEEL OPEN: Brand: MEDLINE

## (undated) DEVICE — EVACUATOR RELIAVAC 100CC

## (undated) DEVICE — CLIP LIG M BLU TI HRT SHP WIRE HORZ

## (undated) DEVICE — 3M™ STERI-DRAPE™ INSTRUMENT POUCH 1018: Brand: STERI-DRAPE™

## (undated) DEVICE — SYSTEM ADIPOSE PROC AUTOLGS ADV INCL CANSTR

## (undated) DEVICE — LIGHT HANDLE

## (undated) DEVICE — UNDERPAD INCONT W23XL36IN STD BLU POLYPR BK

## (undated) DEVICE — BIOPATCH™ ANTIMICROBIAL DRESSING WITH CHLORHEXIDINE GLUCONATE IS A HYDROPHILLIC POLYURETHANE ABSORPTIVE FOAM WITH CHLORHEXIDINE GLUCONATE (CHG) WHICH INHIBITS BACTERIAL GROWTH UNDER THE DRESSING. THE DRESSING IS INTENDED TO BE USED TO ABSORB EXUDATE, COVER A WOUND CAUSED BY VASCULAR AND NONVASCULAR PERCUTANEOUS MEDICAL DEVICES DURING SURGERY, AS WELL AS REDUCE LOCAL INFECTION AND COLONIZATION OF MICROORGANISMS.: Brand: BIOPATCH

## (undated) DEVICE — SUT PLN GUT 5-0 18IN PC-1 ABSRB TAN YELLOWISH

## (undated) DEVICE — ANTIBACTERIAL UNDYED BRAIDED (POLYGLACTIN 910), SYNTHETIC ABSORBABLE SUTURE: Brand: COATED VICRYL

## (undated) DEVICE — ADHESIVE SKIN TOP FOR WND CLSR DERMBND ADV

## (undated) DEVICE — PAD,ABDOMINAL,8"X7.5",ST,LF,20/BX: Brand: MEDLINE INDUSTRIES, INC.

## (undated) DEVICE — HEMOCLIP HORIZON MED 002200

## (undated) DEVICE — GLOVE SUR 6.5 SENSICARE PI PIP CRM PWD F

## (undated) DEVICE — GAUZE,SPONGE,FLUFF,6"X6.75",STRL,5/TRAY: Brand: MEDLINE

## (undated) DEVICE — STERILE POLYISOPRENE POWDER-FREE SURGICAL GLOVES: Brand: PROTEXIS

## (undated) DEVICE — 3M™ IOBAN™ 2 ANTIMICROBIAL INCISE DRAPE 6648EZ: Brand: IOBAN™ 2

## (undated) DEVICE — UNDERPAD 23X36 LIGHT CHUX

## (undated) DEVICE — TOWEL SURG OR 17X30IN BLUE

## (undated) DEVICE — 3M™ STERI-STRIP™ REINFORCED ADHESIVE SKIN CLOSURES, R1548, 1 IN X 5 IN (25 MM X 125 MM), 4 STRIPS/ENVELOPE: Brand: 3M™ STERI-STRIP™

## (undated) DEVICE — SOLUTION PREP 4OZ 10% POVIDONE IOD SCR TOP

## (undated) DEVICE — ASPIRATION TUBING SET, DISPOSABLE: Brand: MICROAIRE®

## (undated) DEVICE — E-Z CLEAN, NON-STICK, PTFE COATED, ELECTROSURGICAL BLADE ELECTRODE, MODIFIED EXTENDED INSULATION, 4 INCH (10.2 CM): Brand: MEGADYNE

## (undated) DEVICE — DRAPE,TAPE STRIPS,STERILE: Brand: MEDLINE

## (undated) DEVICE — PLASTIC BREAST CDS-LF: Brand: MEDLINE INDUSTRIES, INC.

## (undated) DEVICE — CABLE BPLR L12FT FLYING LD DISP

## (undated) DEVICE — SUTURE VCRL SZ 3-0 L27IN ABSRB UD L26MM SH

## (undated) DEVICE — SUTURE SILK 0 FSL

## (undated) DEVICE — ELECTRODE ES L2.75IN XLN STD BLDE MOD E-Z CLN

## (undated) DEVICE — SUT SILK 2-0 FS 685G

## (undated) DEVICE — ABDOMINAL BINDER: Brand: DEROYAL

## (undated) DEVICE — SYRINGE,TOOMEY,IRRIGATION,70CC,STERILE: Brand: MEDLINE

## (undated) DEVICE — STOPCOCK IV 4 WAY BD

## (undated) DEVICE — SOL NACL IRRIG 0.9% 1000ML BTL

## (undated) DEVICE — 3M™ TEGADERM™ TRANSPARENT FILM DRESSING FRAME STYLE, 1624W, US-VER, 100/CARTON 4 CARTONS/CASE: Brand: 3M™ TEGADERM™

## (undated) DEVICE — SLEEVE COMPR M KNEE LEN SGL USE KENDALL SCD

## (undated) DEVICE — LAPAROTOMY SPONGE - RF AND X-RAY DETECTABLE PRE-WASHED: Brand: SITUATE

## (undated) DEVICE — SUTURE MCRYL SZ 4-0 L27IN ABSRB UD L19MM PS-2

## (undated) DEVICE — SUTURE PROL SZ 2-0 L30IN NONABSORB BLU

## (undated) DEVICE — SUTURE PDS II SZ 2-0 L27IN ABSRB VLT SH L26MM

## (undated) DEVICE — GLOVE SUR 7.5 SENSICARE PI PIP CRM PWD F

## (undated) DEVICE — SUT MCRYL 4-0 27IN ABSRB UD 19MM PS-2 3/8

## (undated) DEVICE — 3M™ IOBAN™ 2 ANTIMICROBIAL INCISE DRAPE 6651EZ: Brand: IOBAN™ 2

## (undated) DEVICE — SIZER BRST IMPL 485CC STYL SRF INSPIRA SIL REUSE NATRELLE

## (undated) DEVICE — CLIP SUR SM TI HRT SHP WIRE HORZ LIG SYS

## (undated) DEVICE — VIAL LABORATORY SPY

## (undated) DEVICE — SYRINGE MED 50ML LL TIP DISP

## (undated) DEVICE — HEMOCLIP HORIZON SM 001200

## (undated) DEVICE — DRAIN SURG 15FR TRCR L3/16IN 100% SIL RND

## (undated) DEVICE — 3M™ STERI-STRIP™ REINFORCED ADHESIVE SKIN CLOSURES, R1547, 1/2 IN X 4 IN (12 MM X 100 MM), 6 STRIPS/ENVELOPE: Brand: 3M™ STERI-STRIP™

## (undated) DEVICE — MEGADYNE E-Z CLEAN BLADE 2.75"

## (undated) DEVICE — SUT VICRYL 3-0 SH J416H

## (undated) DEVICE — DRAPE PACK CHEST & U BAR

## (undated) DEVICE — ELECTRODE ES 2.75IN PTFE BLDE MOD E-Z CLN

## (undated) DEVICE — SYRINGE MED 5ML STD CLR PLAS LL TIP N CTRL

## (undated) DEVICE — DRAPE PACK CHEST

## (undated) DEVICE — DRAPE UTIL L W18XL24IN PLAS STR ADH REINF

## (undated) NOTE — LETTER
Mere Christine, :10/7/1970    CONSENT FOR PROCEDURE/SEDATION    1. I authorize the performance upon Mere Christine  the following: Intrauterine Device Removal    2. I authorize Dr. Charlee Serrano MD (and whomever is designated as the doctor’s assistant), to perform the above-mentioned procedures.    3. If any unforeseen conditions arise during this procedure calling for additional  procedures, operations, or medications (including anesthesia and blood transfusion), I further request and authorize the doctor to do whatever he/she deems advisable in my interest.    4. I consent to the taking and reproduction of any photographs in the course of this procedure for professional purposes.    5. I consent to the administration of such sedation as may be considered necessary or advisable by the physician responsible for this service, with the exception of ______________________________________________________    6. I have been informed by my doctor of the nature and purpose of this procedure sedation, possible alternative methods of treatment, risk involved and possible complications.    7. If I have a Do Not Resuscitate (DNR) order in place, the physician and I (or the individual authorized to consent on my behalf) will discuss and agree as to whether the Do Not Resuscitate (DNR) order will remain in effect or will be discontinued during the performance of the procedure and the applicable recovery period. If the Do Not Resuscitate (DNR) order is discontinued and is to be reinstated following the procedure/recovery period, the physician will determine when the applicable recovery period ends for purposes of reinstating the Do Not Resuscitate (DNR) order.    Signature of Patient:_______________________________________________    Signature of person authorized to consent for patient:  _______________________________________________________________    Relationship to patient:  ____________________________________________    Witness: _________________________________________ Date:___________     Physician Signature: _______________________________ Date:___________

## (undated) NOTE — LETTER
Mere Christine, :10/7/1970    CONSENT FOR PROCEDURE/SEDATION    1. I authorize the performance upon [facility]  the following: Carmen Insertion    2.  I authorize Dr. Luis Felipe Nielson MD (and whomever is designated as the doctor’s assistant), Witness: _________________________________________ Date:___________     Physician Signature: _______________________________ Date:___________

## (undated) NOTE — MR AVS SNAPSHOT
After Visit Summary   5/28/2021    Rossy Alexander    MRN: [id number]           Visit Information     Date & Time  5/28/2021 11:45 AM Provider  Cinthia Sierra MD Department  [facility] [address]  Dept.  Phone  [phone]      Your V 5/28/2022      Imaging Scheduling Instructions     Around May 28, 2021   Imaging:   [facility] POLLY 2D+3D SCREENING BILAT ([id number])    Instructions:  Your order will generate a \"Scheduling Ticket\" that will be available in [employer] to schedule on your own [facility]  Monday – Friday  8:00 am – 8:00 pm   Saturday – Sunday  8:00 am – 4:00 pm    WALK-IN CARE  Primary Care Providers  Treatment for acute illness   or injury that are   non-life-threatening.   Also available by appoin

## (undated) NOTE — MR AVS SNAPSHOT
[facility] [address] [facility]                    After Visit Summary   3/3/2017    Bette Vega    MRN: [id number]           Visit Information        Provider Department Dept Phone    3/